# Patient Record
Sex: MALE | Race: WHITE | NOT HISPANIC OR LATINO
[De-identification: names, ages, dates, MRNs, and addresses within clinical notes are randomized per-mention and may not be internally consistent; named-entity substitution may affect disease eponyms.]

---

## 2019-08-02 ENCOUNTER — NON-APPOINTMENT (OUTPATIENT)
Age: 64
End: 2019-08-02

## 2019-08-02 ENCOUNTER — APPOINTMENT (OUTPATIENT)
Dept: CARDIOLOGY | Facility: CLINIC | Age: 64
End: 2019-08-02
Payer: MEDICARE

## 2019-08-02 VITALS
DIASTOLIC BLOOD PRESSURE: 80 MMHG | TEMPERATURE: 97.9 F | HEART RATE: 69 BPM | SYSTOLIC BLOOD PRESSURE: 117 MMHG | WEIGHT: 206 LBS | OXYGEN SATURATION: 98 % | BODY MASS INDEX: 30.51 KG/M2 | HEIGHT: 69 IN

## 2019-08-02 DIAGNOSIS — F41.9 ANXIETY DISORDER, UNSPECIFIED: ICD-10-CM

## 2019-08-02 DIAGNOSIS — R42 DIZZINESS AND GIDDINESS: ICD-10-CM

## 2019-08-02 PROCEDURE — 99205 OFFICE O/P NEW HI 60 MIN: CPT

## 2019-08-02 PROCEDURE — 93000 ELECTROCARDIOGRAM COMPLETE: CPT

## 2019-08-02 PROCEDURE — 93306 TTE W/DOPPLER COMPLETE: CPT

## 2019-08-02 RX ORDER — CYCLOBENZAPRINE HYDROCHLORIDE 10 MG/1
10 TABLET, FILM COATED ORAL
Qty: 180 | Refills: 3 | Status: ACTIVE | COMMUNITY
Start: 2019-08-02 | End: 1900-01-01

## 2019-08-02 NOTE — PHYSICAL EXAM
[General Appearance - Well Developed] : well developed [Normal Appearance] : normal appearance [Well Groomed] : well groomed [General Appearance - Well Nourished] : well nourished [No Deformities] : no deformities [General Appearance - In No Acute Distress] : no acute distress [Normal Conjunctiva] : the conjunctiva exhibited no abnormalities [Eyelids - No Xanthelasma] : the eyelids demonstrated no xanthelasmas [No Oral Pallor] : no oral pallor [Normal Oral Mucosa] : normal oral mucosa [No Oral Cyanosis] : no oral cyanosis [Normal Jugular Venous A Waves Present] : normal jugular venous A waves present [Normal Jugular Venous V Waves Present] : normal jugular venous V waves present [No Jugular Venous Bowie A Waves] : no jugular venous bowie A waves [Heart Rate And Rhythm] : heart rate and rhythm were normal [Heart Sounds] : normal S1 and S2 [Murmurs] : no murmurs present [Respiration, Rhythm And Depth] : normal respiratory rhythm and effort [Exaggerated Use Of Accessory Muscles For Inspiration] : no accessory muscle use [Auscultation Breath Sounds / Voice Sounds] : lungs were clear to auscultation bilaterally [Abdomen Tenderness] : non-tender [Abdomen Soft] : soft [Abdomen Mass (___ Cm)] : no abdominal mass palpated [Abnormal Walk] : normal gait [Gait - Sufficient For Exercise Testing] : the gait was sufficient for exercise testing [Nail Clubbing] : no clubbing of the fingernails [Cyanosis, Localized] : no localized cyanosis [Petechial Hemorrhages (___cm)] : no petechial hemorrhages [Skin Color & Pigmentation] : normal skin color and pigmentation [] : no rash [No Venous Stasis] : no venous stasis [Skin Lesions] : no skin lesions [No Skin Ulcers] : no skin ulcer [No Xanthoma] : no  xanthoma was observed [Oriented To Time, Place, And Person] : oriented to person, place, and time [Affect] : the affect was normal [Mood] : the mood was normal [No Anxiety] : not feeling anxious

## 2019-08-06 NOTE — REASON FOR VISIT
[FreeTextEntry1] : Dear Dr. Mireles:\par \par I had the pleasure of seeing your patient, Mr. KALI SAMPSON in the office today for evaluation of chest pain.  I last saw him in January 2014, and since then, he and his wife have moved to [facility] where he spends most of the year.  He has remained mostly asymptomatic from the cardiac perspective over the past several years.  He is asking for refills to medications.\par \par As you know, he is a 62 yo man with a history of coronary artery disease status post previous PCIs performed by Dr. Pavel Murphy, PAD, DM2, HTN, and hyperlipidemia.  He still smokes.  He denies having exertional chest discomfort.  We reviewed his medications and he is still on DAPT with ASA and clopidogrel (on the advice of Dr. Murphy, who recommended that he stay on DAPT for the "rest of his life" because of his severity of CAD).  \par \par 12-lead ECG demonstrates SR.  Physical examination was unremarkable.\par \par Refills for his medications were provided.  Will arrange for an echocardiogram.\par \par Dr. Mireles, thank you again for entrusting his care with me.\par \par Best regards,\par Fabian Sommers

## 2019-09-17 ENCOUNTER — INPATIENT (INPATIENT)
Facility: HOSPITAL | Age: 64
LOS: 6 days | Discharge: HOME HEALTH | End: 2019-09-24
Attending: HOSPITALIST | Admitting: HOSPITALIST
Payer: MEDICARE

## 2019-09-17 VITALS
HEART RATE: 80 BPM | RESPIRATION RATE: 17 BRPM | DIASTOLIC BLOOD PRESSURE: 85 MMHG | SYSTOLIC BLOOD PRESSURE: 134 MMHG | OXYGEN SATURATION: 100 % | TEMPERATURE: 98 F

## 2019-09-17 DIAGNOSIS — E11.40 TYPE 2 DIABETES MELLITUS WITH DIABETIC NEUROPATHY, UNSPECIFIED: ICD-10-CM

## 2019-09-17 DIAGNOSIS — E87.2 ACIDOSIS: ICD-10-CM

## 2019-09-17 DIAGNOSIS — I10 ESSENTIAL (PRIMARY) HYPERTENSION: ICD-10-CM

## 2019-09-17 DIAGNOSIS — L97.529 NON-PRESSURE CHRONIC ULCER OF OTHER PART OF LEFT FOOT WITH UNSPECIFIED SEVERITY: ICD-10-CM

## 2019-09-17 DIAGNOSIS — Z95.5 PRESENCE OF CORONARY ANGIOPLASTY IMPLANT AND GRAFT: ICD-10-CM

## 2019-09-17 DIAGNOSIS — Z29.9 ENCOUNTER FOR PROPHYLACTIC MEASURES, UNSPECIFIED: ICD-10-CM

## 2019-09-17 LAB
ALBUMIN SERPL ELPH-MCNC: 4.1 G/DL — SIGNIFICANT CHANGE UP (ref 3.3–5)
ALP SERPL-CCNC: 101 U/L — SIGNIFICANT CHANGE UP (ref 40–120)
ALT FLD-CCNC: 10 U/L — SIGNIFICANT CHANGE UP (ref 4–41)
ANION GAP SERPL CALC-SCNC: 11 MMO/L — SIGNIFICANT CHANGE UP (ref 7–14)
APTT BLD: 37.4 SEC — HIGH (ref 27.5–36.3)
AST SERPL-CCNC: 15 U/L — SIGNIFICANT CHANGE UP (ref 4–40)
BASE EXCESS BLDV CALC-SCNC: 2.1 MMOL/L — SIGNIFICANT CHANGE UP
BASOPHILS # BLD AUTO: 0.1 K/UL — SIGNIFICANT CHANGE UP (ref 0–0.2)
BASOPHILS NFR BLD AUTO: 0.7 % — SIGNIFICANT CHANGE UP (ref 0–2)
BILIRUB SERPL-MCNC: 0.3 MG/DL — SIGNIFICANT CHANGE UP (ref 0.2–1.2)
BLOOD GAS VENOUS - CREATININE: 0.75 MG/DL — SIGNIFICANT CHANGE UP (ref 0.5–1.3)
BUN SERPL-MCNC: 18 MG/DL — SIGNIFICANT CHANGE UP (ref 7–23)
CALCIUM SERPL-MCNC: 10 MG/DL — SIGNIFICANT CHANGE UP (ref 8.4–10.5)
CHLORIDE BLDV-SCNC: 108 MMOL/L — SIGNIFICANT CHANGE UP (ref 96–108)
CHLORIDE SERPL-SCNC: 100 MMOL/L — SIGNIFICANT CHANGE UP (ref 98–107)
CO2 SERPL-SCNC: 26 MMOL/L — SIGNIFICANT CHANGE UP (ref 22–31)
CREAT SERPL-MCNC: 0.83 MG/DL — SIGNIFICANT CHANGE UP (ref 0.5–1.3)
CRP SERPL-MCNC: 28.6 MG/L — HIGH
EOSINOPHIL # BLD AUTO: 0.49 K/UL — SIGNIFICANT CHANGE UP (ref 0–0.5)
EOSINOPHIL NFR BLD AUTO: 3.2 % — SIGNIFICANT CHANGE UP (ref 0–6)
ERYTHROCYTE [SEDIMENTATION RATE] IN BLOOD: 51 MM/HR — HIGH (ref 1–15)
GAS PNL BLDV: 135 MMOL/L — LOW (ref 136–146)
GLUCOSE BLDC GLUCOMTR-MCNC: 138 MG/DL — HIGH (ref 70–99)
GLUCOSE BLDV-MCNC: 141 MG/DL — HIGH (ref 70–99)
GLUCOSE SERPL-MCNC: 141 MG/DL — HIGH (ref 70–99)
GRAM STN SPEC: SIGNIFICANT CHANGE UP
HCO3 BLDV-SCNC: 23 MMOL/L — SIGNIFICANT CHANGE UP (ref 20–27)
HCT VFR BLD CALC: 52.1 % — HIGH (ref 39–50)
HCT VFR BLDV CALC: 53.4 % — HIGH (ref 39–51)
HGB BLD-MCNC: 17.2 G/DL — HIGH (ref 13–17)
HGB BLDV-MCNC: 17.5 G/DL — HIGH (ref 13–17)
IMM GRANULOCYTES NFR BLD AUTO: 0.6 % — SIGNIFICANT CHANGE UP (ref 0–1.5)
INR BLD: 1.05 — SIGNIFICANT CHANGE UP (ref 0.88–1.17)
LACTATE BLDV-MCNC: 2.4 MMOL/L — HIGH (ref 0.5–2)
LACTATE SERPL-SCNC: 1.5 MMOL/L — SIGNIFICANT CHANGE UP (ref 0.5–2)
LYMPHOCYTES # BLD AUTO: 17.1 % — SIGNIFICANT CHANGE UP (ref 13–44)
LYMPHOCYTES # BLD AUTO: 2.62 K/UL — SIGNIFICANT CHANGE UP (ref 1–3.3)
MCHC RBC-ENTMCNC: 28.6 PG — SIGNIFICANT CHANGE UP (ref 27–34)
MCHC RBC-ENTMCNC: 33 % — SIGNIFICANT CHANGE UP (ref 32–36)
MCV RBC AUTO: 86.7 FL — SIGNIFICANT CHANGE UP (ref 80–100)
MONOCYTES # BLD AUTO: 1.22 K/UL — HIGH (ref 0–0.9)
MONOCYTES NFR BLD AUTO: 8 % — SIGNIFICANT CHANGE UP (ref 2–14)
NEUTROPHILS # BLD AUTO: 10.79 K/UL — HIGH (ref 1.8–7.4)
NEUTROPHILS NFR BLD AUTO: 70.4 % — SIGNIFICANT CHANGE UP (ref 43–77)
NRBC # FLD: 0 K/UL — SIGNIFICANT CHANGE UP (ref 0–0)
PCO2 BLDV: 59 MMHG — HIGH (ref 41–51)
PH BLDV: 7.3 PH — LOW (ref 7.32–7.43)
PLATELET # BLD AUTO: 339 K/UL — SIGNIFICANT CHANGE UP (ref 150–400)
PMV BLD: 10 FL — SIGNIFICANT CHANGE UP (ref 7–13)
PO2 BLDV: 31 MMHG — LOW (ref 35–40)
POTASSIUM BLDV-SCNC: 4.4 MMOL/L — SIGNIFICANT CHANGE UP (ref 3.4–4.5)
POTASSIUM SERPL-MCNC: 4.8 MMOL/L — SIGNIFICANT CHANGE UP (ref 3.5–5.3)
POTASSIUM SERPL-SCNC: 4.8 MMOL/L — SIGNIFICANT CHANGE UP (ref 3.5–5.3)
PROT SERPL-MCNC: 7.9 G/DL — SIGNIFICANT CHANGE UP (ref 6–8.3)
PROTHROM AB SERPL-ACNC: 11.7 SEC — SIGNIFICANT CHANGE UP (ref 9.8–13.1)
RBC # BLD: 6.01 M/UL — HIGH (ref 4.2–5.8)
RBC # FLD: 13.2 % — SIGNIFICANT CHANGE UP (ref 10.3–14.5)
SAO2 % BLDV: 57 % — LOW (ref 60–85)
SODIUM SERPL-SCNC: 137 MMOL/L — SIGNIFICANT CHANGE UP (ref 135–145)
SPECIMEN SOURCE: SIGNIFICANT CHANGE UP
WBC # BLD: 15.31 K/UL — HIGH (ref 3.8–10.5)
WBC # FLD AUTO: 15.31 K/UL — HIGH (ref 3.8–10.5)

## 2019-09-17 PROCEDURE — 73630 X-RAY EXAM OF FOOT: CPT | Mod: 26,LT

## 2019-09-17 PROCEDURE — 73610 X-RAY EXAM OF ANKLE: CPT | Mod: 26,LT

## 2019-09-17 PROCEDURE — 93923 UPR/LXTR ART STDY 3+ LVLS: CPT | Mod: 26

## 2019-09-17 PROCEDURE — 99223 1ST HOSP IP/OBS HIGH 75: CPT | Mod: AI,GC

## 2019-09-17 RX ORDER — DEXTROSE 50 % IN WATER 50 %
15 SYRINGE (ML) INTRAVENOUS ONCE
Refills: 0 | Status: DISCONTINUED | OUTPATIENT
Start: 2019-09-17 | End: 2019-09-24

## 2019-09-17 RX ORDER — SIMVASTATIN 20 MG/1
10 TABLET, FILM COATED ORAL AT BEDTIME
Refills: 0 | Status: DISCONTINUED | OUTPATIENT
Start: 2019-09-17 | End: 2019-09-24

## 2019-09-17 RX ORDER — ASPIRIN/CALCIUM CARB/MAGNESIUM 324 MG
325 TABLET ORAL DAILY
Refills: 0 | Status: DISCONTINUED | OUTPATIENT
Start: 2019-09-17 | End: 2019-09-19

## 2019-09-17 RX ORDER — INSULIN LISPRO 100/ML
VIAL (ML) SUBCUTANEOUS
Refills: 0 | Status: DISCONTINUED | OUTPATIENT
Start: 2019-09-17 | End: 2019-09-24

## 2019-09-17 RX ORDER — INFLUENZA VIRUS VACCINE 15; 15; 15; 15 UG/.5ML; UG/.5ML; UG/.5ML; UG/.5ML
0.5 SUSPENSION INTRAMUSCULAR ONCE
Refills: 0 | Status: DISCONTINUED | OUTPATIENT
Start: 2019-09-17 | End: 2019-09-24

## 2019-09-17 RX ORDER — SODIUM CHLORIDE 9 MG/ML
1000 INJECTION, SOLUTION INTRAVENOUS
Refills: 0 | Status: DISCONTINUED | OUTPATIENT
Start: 2019-09-17 | End: 2019-09-24

## 2019-09-17 RX ORDER — DEXTROSE 50 % IN WATER 50 %
25 SYRINGE (ML) INTRAVENOUS ONCE
Refills: 0 | Status: DISCONTINUED | OUTPATIENT
Start: 2019-09-17 | End: 2019-09-24

## 2019-09-17 RX ORDER — DEXTROSE 50 % IN WATER 50 %
12.5 SYRINGE (ML) INTRAVENOUS ONCE
Refills: 0 | Status: DISCONTINUED | OUTPATIENT
Start: 2019-09-17 | End: 2019-09-24

## 2019-09-17 RX ORDER — LIDOCAINE HCL 20 MG/ML
20 VIAL (ML) INJECTION ONCE
Refills: 0 | Status: DISCONTINUED | OUTPATIENT
Start: 2019-09-17 | End: 2019-09-24

## 2019-09-17 RX ORDER — METOPROLOL TARTRATE 50 MG
50 TABLET ORAL DAILY
Refills: 0 | Status: DISCONTINUED | OUTPATIENT
Start: 2019-09-17 | End: 2019-09-18

## 2019-09-17 RX ORDER — LISINOPRIL 2.5 MG/1
5 TABLET ORAL DAILY
Refills: 0 | Status: DISCONTINUED | OUTPATIENT
Start: 2019-09-17 | End: 2019-09-18

## 2019-09-17 RX ORDER — VANCOMYCIN HCL 1 G
1000 VIAL (EA) INTRAVENOUS EVERY 12 HOURS
Refills: 0 | Status: DISCONTINUED | OUTPATIENT
Start: 2019-09-17 | End: 2019-09-19

## 2019-09-17 RX ORDER — PIPERACILLIN AND TAZOBACTAM 4; .5 G/20ML; G/20ML
3.38 INJECTION, POWDER, LYOPHILIZED, FOR SOLUTION INTRAVENOUS ONCE
Refills: 0 | Status: COMPLETED | OUTPATIENT
Start: 2019-09-17 | End: 2019-09-17

## 2019-09-17 RX ORDER — INSULIN LISPRO 100/ML
VIAL (ML) SUBCUTANEOUS AT BEDTIME
Refills: 0 | Status: DISCONTINUED | OUTPATIENT
Start: 2019-09-17 | End: 2019-09-24

## 2019-09-17 RX ORDER — PIPERACILLIN AND TAZOBACTAM 4; .5 G/20ML; G/20ML
3.38 INJECTION, POWDER, LYOPHILIZED, FOR SOLUTION INTRAVENOUS EVERY 8 HOURS
Refills: 0 | Status: DISCONTINUED | OUTPATIENT
Start: 2019-09-17 | End: 2019-09-23

## 2019-09-17 RX ORDER — SODIUM CHLORIDE 9 MG/ML
1000 INJECTION INTRAMUSCULAR; INTRAVENOUS; SUBCUTANEOUS
Refills: 0 | Status: DISCONTINUED | OUTPATIENT
Start: 2019-09-17 | End: 2019-09-19

## 2019-09-17 RX ORDER — GLUCAGON INJECTION, SOLUTION 0.5 MG/.1ML
1 INJECTION, SOLUTION SUBCUTANEOUS ONCE
Refills: 0 | Status: DISCONTINUED | OUTPATIENT
Start: 2019-09-17 | End: 2019-09-24

## 2019-09-17 RX ORDER — VANCOMYCIN HCL 1 G
1000 VIAL (EA) INTRAVENOUS ONCE
Refills: 0 | Status: COMPLETED | OUTPATIENT
Start: 2019-09-17 | End: 2019-09-17

## 2019-09-17 RX ORDER — ENOXAPARIN SODIUM 100 MG/ML
40 INJECTION SUBCUTANEOUS DAILY
Refills: 0 | Status: DISCONTINUED | OUTPATIENT
Start: 2019-09-17 | End: 2019-09-24

## 2019-09-17 RX ORDER — CLOPIDOGREL BISULFATE 75 MG/1
75 TABLET, FILM COATED ORAL DAILY
Refills: 0 | Status: DISCONTINUED | OUTPATIENT
Start: 2019-09-17 | End: 2019-09-24

## 2019-09-17 RX ORDER — COLLAGENASE CLOSTRIDIUM HIST. 250 UNIT/G
1 OINTMENT (GRAM) TOPICAL DAILY
Refills: 0 | Status: DISCONTINUED | OUTPATIENT
Start: 2019-09-17 | End: 2019-09-24

## 2019-09-17 RX ORDER — ESCITALOPRAM OXALATE 10 MG/1
5 TABLET, FILM COATED ORAL DAILY
Refills: 0 | Status: DISCONTINUED | OUTPATIENT
Start: 2019-09-17 | End: 2019-09-17

## 2019-09-17 RX ADMIN — PIPERACILLIN AND TAZOBACTAM 25 GRAM(S): 4; .5 INJECTION, POWDER, LYOPHILIZED, FOR SOLUTION INTRAVENOUS at 22:45

## 2019-09-17 RX ADMIN — SODIUM CHLORIDE 75 MILLILITER(S): 9 INJECTION INTRAMUSCULAR; INTRAVENOUS; SUBCUTANEOUS at 19:16

## 2019-09-17 RX ADMIN — PIPERACILLIN AND TAZOBACTAM 200 GRAM(S): 4; .5 INJECTION, POWDER, LYOPHILIZED, FOR SOLUTION INTRAVENOUS at 13:11

## 2019-09-17 RX ADMIN — Medication 250 MILLIGRAM(S): at 14:48

## 2019-09-17 RX ADMIN — ENOXAPARIN SODIUM 40 MILLIGRAM(S): 100 INJECTION SUBCUTANEOUS at 22:46

## 2019-09-17 RX ADMIN — SIMVASTATIN 10 MILLIGRAM(S): 20 TABLET, FILM COATED ORAL at 22:46

## 2019-09-17 NOTE — H&P ADULT - NSICDXPASTSURGICALHX_GEN_ALL_CORE_FT
PAST SURGICAL HISTORY:  Ankle Fracture left repaired    H/O: Varicose Veins laser surgery    Knee Arthroplasty

## 2019-09-17 NOTE — H&P ADULT - NSHPPHYSICALEXAM_GEN_ALL_CORE
Vital Signs Last 24 Hrs  T(C): 36.7 (17 Sep 2019 09:42), Max: 36.7 (17 Sep 2019 09:42)  T(F): 98 (17 Sep 2019 09:42), Max: 98 (17 Sep 2019 09:42)  HR: 80 (17 Sep 2019 09:42) (80 - 80)  BP: 134/85 (17 Sep 2019 09:42) (134/85 - 134/85)  BP(mean): --  RR: 17 (17 Sep 2019 09:42) (17 - 17)  SpO2: 100% (17 Sep 2019 09:42) (100% - 100%)    General: Well-nourished, well-developed, NAD  Head: Normocephalic, Atraumatic  Eyes: PERRLA, EOMI, normal sclera  Throat: Moist mucous membranes  Respiratory: CTAB, normal respiratory effort, no wheezes, crackles, rales  CV: RRR, S1/S2, no murmurs, rubs or gallops  Abdominal: Soft, bowel sounds present, NT, ND +BS  Extremities: No edema, 2+ DP pulses  Neurological: A&Ox4, MAEx4, non-focal  Skin: No rashes Vital Signs Last 24 Hrs  T(C): 36.7 (17 Sep 2019 09:42), Max: 36.7 (17 Sep 2019 09:42)  T(F): 98 (17 Sep 2019 09:42), Max: 98 (17 Sep 2019 09:42)  HR: 80 (17 Sep 2019 09:42) (80 - 80)  BP: 134/85 (17 Sep 2019 09:42) (134/85 - 134/85)  BP(mean): --  RR: 17 (17 Sep 2019 09:42) (17 - 17)  SpO2: 100% (17 Sep 2019 09:42) (100% - 100%)    General: Well-nourished, well-developed, NAD, obese  Head: Normocephalic, Atraumatic  Eyes: PERRLA, EOMI, normal sclera  Throat: Moist mucous membranes  Respiratory: CTAB, normal respiratory effort, no wheezes, crackles, rales  CV: RRR, S1/S2, no murmurs, rubs or gallops  Abdominal: Soft, bowel sounds present, NT, ND +BS  Extremities: No edema, foot pulses weak, left foot with brown open crusted ulcer on fifth metatarsal, no drainage or odor, no purulence  Neurological: A&Ox4, MAEx4, non-focal  Skin: No rashes

## 2019-09-17 NOTE — ED PROVIDER NOTE - PROGRESS NOTE DETAILS
[facility]: Pt seen by podiatry who recommend admission for IV abx. spoke with hospitalist who accepted patient, aware labs pending. text paged MAR. pt stable.

## 2019-09-17 NOTE — ED PROVIDER NOTE - PHYSICAL EXAMINATION
L foot with lateral plantar ulcer ~ quarter sized with erythema to midfoot and swelling to ankle  sensation intact, moving all digits, pulses intact  FROM of ankle  to TTP.

## 2019-09-17 NOTE — H&P ADULT - HISTORY OF PRESENT ILLNESS
63 y.o male pmhx of HTN, DM, CAD on plavix ASA sent in by Podiatrist Dr. Peacock for L foot ulcer. Pt states 2 weeks ago noticed a blister to his L lateral plantar foot, it popped a few days ago and pt had amoxicillin at home that he started himself. Went to see his podiatrist yesterday who advised to come to the ED. Pt has been ambulatory,using cane for assistance and arrives in surgical shoe. denies any systemic symptoms. denies fevers, chills, chest pain, sob, cough, n/v/d, abdominal pain, numbness, tingling, weakness. 63 year old male with history of HTN, T2DM (on insulin), CAD s/p stents was sent in by podiatrist for a left foot ulcer. The patient states that he developed a blister on his left foot about 2-3 weeks ago. Since he has peripheral neuropathy, he cannot feel any sensation in his feet. So, he could not feel that the blister had developed into an ulcer. It eventually became malodorous and started to bleed. He tried amoxicillin 875 mg BID for four days and saw that there was no improvement. So, he went to see his podiatrist who told him to go to the ED. The patient believes the ulcer was caused by new shoes he bought three weeks ago, as he had no associated trauma. He is supposed to see his podiatrist every three months, but has not seen him in one year. He has been compliant with his insulin regimen, but there was a period when he did not have his medications for two weeks due to insurance issues. His blood sugar elevated to around 400 mg/dL.  He denies fevers, chills, chest pain, shortness of breath, cough, nausea, vomiting, diarrhea, abdominal pain, diarrhea, or urinary symptoms.    In the ED, vital signs were stable.  The patient received 1g vancomycin x1, zosyn x1, lidocaine 1% injection, and collagenase ointment.

## 2019-09-17 NOTE — ED PROVIDER NOTE - OBJECTIVE STATEMENT
63 y.o male pmhx of HTN, DM, CAD on plavix ASA sent in by Podiatrist Dr. Peacock for L foot ulcer. Pt states 2 weeks ago noticed a blister to his L lateral plantar foot, it popped a few days ago and pt had amoxicillin at home that he started himself. Went to see his podiatrist yesterday who advised to come to the ED. Pt has been ambulatory,using cane for assistance and arrives in surgical shoe. denies any systemic symptoms. denies fevers, chills, chest pain, sob, cough, n/v/d, abdominal pain, numbness, tingling, weakness.

## 2019-09-17 NOTE — ED ADULT NURSE NOTE - NSIMPLEMENTINTERV_GEN_ALL_ED
Implemented All Fall Risk Interventions:  [place] to call system. Call bell, personal items and telephone within reach. Instruct patient to call for assistance. Room bathroom lighting operational. Non-slip footwear when patient is off stretcher. Physically safe environment: no spills, clutter or unnecessary equipment. Stretcher in lowest position, wheels locked, appropriate side rails in place. Provide visual cue, wrist band, yellow gown, etc. Monitor gait and stability. Monitor for mental status changes and reorient to person, place, and time. Review medications for side effects contributing to fall risk. Reinforce activity limits and safety measures with patient and family.

## 2019-09-17 NOTE — H&P ADULT - PROBLEM SELECTOR PLAN 6
- DVT: low improve score; SCDs  - Diet: DASH/TLC consistent carbohydrates - DVT: lovenox  - Diet: DASH/TLC consistent carbohydrates

## 2019-09-17 NOTE — H&P ADULT - PROBLEM SELECTOR PLAN 4
- blood pressure stable  - will hold lisinopril and setting of lactic acidemia  - will resume once lactate normalized

## 2019-09-17 NOTE — ED PROVIDER NOTE - ATTENDING CONTRIBUTION TO CARE
Dr. Brown:  I performed a face to face bedside interview with patient regarding history of present illness, review of symptoms and past medical history. I completed an independent physical exam.  I have discussed patient's plan of care with PA.   I agree with note as stated above, having amended the EMR as needed to reflect my findings.   This includes HISTORY OF PRESENT ILLNESS, HIV, PAST MEDICAL/SURGICAL/FAMILY/SOCIAL HISTORY, ALLERGIES AND HOME MEDICATIONS, REVIEW OF SYSTEMS, PHYSICAL EXAM, and any PROGRESS NOTES during the time I functioned as the attending physician for this patient.    63M h/o CAD on Plavix/Asa, DM, sent in by PCP for foot infection.  Pt noticed blister to left foot two weeks, then spontaneously ruptured.  Started on home Augmentin, saw PCP yesterday (Dr. Peacock), who recommended that he come to ED for IV abx.  Denies fevers and other constitutional symptoms.    Exam:  - nad  - rrr  - ctab   -abd soft ntnd  - +left foot swollen up to ankle and lateral erythema and area of necrotic ulcer centrally, neurovascularly intact    A/P  - infected ulcer in diabetic  - basic labs, esr, crp, XR foot  - podiatry consult  - abx

## 2019-09-17 NOTE — H&P ADULT - ATTENDING COMMENTS
Patient seen and examined by myself , case discussed  with resident ,agree with the above finding and plan  pt denies headache, dizziness, SOB, CP, Palpitations , N/V/D, abdominal pain, reports numbness, tingling in B/L LE Podiatry eval appreciated , left foot wound- eschar debrided, will f/u AVELINA/PVR, MRI   will c/w vanco and Zosyn   f/u Blood and wound cx  elevated lactate: will repeat   DM type 2, will check hba1c , FSBS, ISS   DVT PPX

## 2019-09-17 NOTE — H&P ADULT - PROBLEM SELECTOR PLAN 2
Lactate elevated at 2.4. Etiology unclear as no history of liver disease and not septic.  - will treat with IV fluids  - repeat lactate in AM Lactate elevated at 2.4. Etiology unclear as no history of liver disease and not septic.  - will treat with IV fluids  - will repeat lactate

## 2019-09-17 NOTE — H&P ADULT - PROBLEM SELECTOR PLAN 1
Patient presents with three week history of ulcer. ESR 51. CRP 28.6.  - patient seen by podiatry; follow up on recs  - Foot xray without signs of osteomyelitis  - [facility] duplex and foot MRI ordered  - follow up on wound culture  - continue with IV antibiotics Patient presents with three week history of ulcer. ESR 51. CRP 28.6.  - patient seen by podiatry; follow up on recs  - Foot xray without signs of osteomyelitis  - [facility] duplex and foot MRI ordered  - follow up on wound culture  - continue with vancomycin and zosyn

## 2019-09-17 NOTE — H&P ADULT - ASSESSMENT
63 year old male with history of HTN, T2DM (on insulin), CAD s/p stents was sent in by podiatrist for a left foot ulcer, admitted for further management.

## 2019-09-17 NOTE — H&P ADULT - NSICDXPASTMEDICALHX_GEN_ALL_CORE_FT
PAST MEDICAL HISTORY:  COPD (Chronic Obstructive Pulmonary Disease)     Coronary Artery Disease     Diabetes Mellitus Type II     Hyperlipemia     Hypertension     Old myocardial infarction     Stented Coronary Artery PAST MEDICAL HISTORY:  COPD (Chronic Obstructive Pulmonary Disease)     Diabetes Mellitus Type II     Hypertension     Old myocardial infarction     Stented Coronary Artery

## 2019-09-17 NOTE — H&P ADULT - NSHPREVIEWOFSYSTEMS_GEN_ALL_CORE
REVIEW OF SYSTEMS:  CONSTITUTIONAL: No fever, chills, night sweats, or fatigue  EYES: No eye pain, visual disturbances, or discharge  ENT:  No difficulty hearing, tinnitus, vertigo; No sinus or throat pain  NECK: No pain or stiffness  RESPIRATORY: No cough, wheezing, or hemoptysis; No shortness of breath  CARDIOVASCULAR: No chest pain, palpitations, dizziness, or leg swelling  GASTROINTESTINAL: No abdominal or epigastric pain. No nausea, vomiting, or hematemesis; No diarrhea or constipation. No melena or hematochezia.  GENITOURINARY: No dysuria, frequency, hematuria, or incontinence  NEUROLOGICAL: No headaches, memory loss, loss of strength, numbness, or tremors  SKIN: No itching, burning, rashes, or lesions   LYMPH NODES: No enlarged glands  ENDOCRINE: No hot or cold intolerance; No hair loss  MUSCULOSKELETAL: No joint pain or swelling; No muscle, back, or extremity pain  PSYCHIATRIC: No depression, anxiety, mood swings, or difficulty sleeping  HEME/LYMPH: No easy bruising, or bleeding gums  ALLERGY AND IMMUNOLOGIC: No hives or eczema REVIEW OF SYSTEMS:  CONSTITUTIONAL: No fever, chills, night sweats, or fatigue  EYES: No eye pain, visual disturbances, or discharge  ENT:  No difficulty hearing, tinnitus, vertigo  NECK: No pain or stiffness  RESPIRATORY: No cough, wheezing, or hemoptysis  CARDIOVASCULAR: No chest pain, palpitations, dizziness, or leg swelling  GASTROINTESTINAL: No abdominal or epigastric pain.   GENITOURINARY: No dysuria, frequency, hematuria, or incontinence  NEUROLOGICAL: No headaches, memory loss, loss of strength, numbness, or tremors  SKIN: No itching, burning, rashes, or lesions   LYMPH NODES: No enlarged glands  ENDOCRINE: No hot or cold intolerance; No hair loss  MUSCULOSKELETAL: No joint pain or swelling; No muscle, back, or extremity pain  PSYCHIATRIC: No depression, anxiety, mood swings, or difficulty sleeping  HEME/LYMPH: No easy bruising, or bleeding gums  ALLERGY AND IMMUNOLOGIC: No hives or eczema

## 2019-09-17 NOTE — ED PROVIDER NOTE - PMH
COPD (Chronic Obstructive Pulmonary Disease)    Coronary Artery Disease    Diabetes Mellitus Type II    Hyperlipemia    Hypertension    Old myocardial infarction    Stented Coronary Artery

## 2019-09-17 NOTE — ED PROVIDER NOTE - CLINICAL SUMMARY MEDICAL DECISION MAKING FREE TEXT BOX
63 y.o male pmhx of HTN, DM, CAD on plavix ASA sent in by Podiatrist Dr. Peacock for L foot ulcer. Will check labs, ESR, CRP, xray foot/ankle, podiatry consult, admit.

## 2019-09-17 NOTE — H&P ADULT - NSHPLABSRESULTS_GEN_ALL_CORE
LABS:                          17.2   15.31 )-----------( 339      ( 17 Sep 2019 12:10 )             52.1     Hb Trend: 17.2<--  WBC Trend: 15.31<--  Plt Trend: 339<--          09-17    137  |  100  |  18  ----------------------------<  141<H>  4.8   |  26  |  0.83    Ca    10.0      17 Sep 2019 12:10    TPro  7.9  /  Alb  4.1  /  TBili  0.3  /  DBili  x   /  AST  15  /  ALT  10  /  AlkPhos  101  09-17      PT/INR - ( 17 Sep 2019 12:10 )   PT: 11.7 SEC;   INR: 1.05          PTT - ( 17 Sep 2019 12:10 )  PTT:37.4 SEC    CAPILLARY BLOOD GLUCOSE      POCT Blood Glucose.: 171 mg/dL (17 Sep 2019 09:45)          IMAGING:    < from: Xray Foot AP + Lateral + Oblique, Left (09.17.19 @ 12:54) >    EXAM:  RAD FOOT MIN 3 VIEWS LT      EXAM:  RAD ANKLE MIN 3 VIEWS LEFT        PROCEDURE DATE:  Sep 17 2019         INTERPRETATION:  CLINICAL INDICATION: left ankle/foot ulceration;   evaluate for deep infection    EXAM:  Frontal, oblique, and lateral left ankle and foot from 9/17/2019 at 1254.   IMPRESSION no similar    IMPRESSION:  Suggestion of a shallow posterior heel ulceration. No tracking gas   collections beyond this region and no gross radiographic evidence for   underlying osteomyelitis.    Chronic corticated ossification adjacent to medial malleolar tip, chronic   mild bony productive changes along talofibular margins, and thick   bridging ossification across distal tibiofibular syndesmotic space   consistent with old posttraumaticchange.    No dislocations or acute appearing fractures.    Tarsometatarsal alignment maintained without evidence for a Lisfranc   injury.    Mild tibiotalar arthrosis with subarticular cystic change along the   tibial plafond also apparent. Congenitally fused 5th DIP joint Preserved   remaining joint spaces and no joint margin erosions.     Thick plantar calcaneal enthesophyte with adjacent separate discontinuous   chronic ossifications.    No discrete lytic or blastic lesions.    < end of copied text >

## 2019-09-18 ENCOUNTER — TRANSCRIPTION ENCOUNTER (OUTPATIENT)
Age: 64
End: 2019-09-18

## 2019-09-18 LAB
ANION GAP SERPL CALC-SCNC: 10 MMO/L — SIGNIFICANT CHANGE UP (ref 7–14)
BUN SERPL-MCNC: 13 MG/DL — SIGNIFICANT CHANGE UP (ref 7–23)
CALCIUM SERPL-MCNC: 9.2 MG/DL — SIGNIFICANT CHANGE UP (ref 8.4–10.5)
CHLORIDE SERPL-SCNC: 101 MMOL/L — SIGNIFICANT CHANGE UP (ref 98–107)
CO2 SERPL-SCNC: 24 MMOL/L — SIGNIFICANT CHANGE UP (ref 22–31)
CREAT SERPL-MCNC: 0.85 MG/DL — SIGNIFICANT CHANGE UP (ref 0.5–1.3)
GLUCOSE BLDC GLUCOMTR-MCNC: 120 MG/DL — HIGH (ref 70–99)
GLUCOSE BLDC GLUCOMTR-MCNC: 140 MG/DL — HIGH (ref 70–99)
GLUCOSE BLDC GLUCOMTR-MCNC: 140 MG/DL — HIGH (ref 70–99)
GLUCOSE BLDC GLUCOMTR-MCNC: 144 MG/DL — HIGH (ref 70–99)
GLUCOSE SERPL-MCNC: 133 MG/DL — HIGH (ref 70–99)
HBA1C BLD-MCNC: 9.3 % — HIGH (ref 4–5.6)
HCT VFR BLD CALC: 47.5 % — SIGNIFICANT CHANGE UP (ref 39–50)
HCV AB S/CO SERPL IA: 0.2 S/CO — SIGNIFICANT CHANGE UP (ref 0–0.99)
HCV AB SERPL-IMP: SIGNIFICANT CHANGE UP
HGB BLD-MCNC: 15.9 G/DL — SIGNIFICANT CHANGE UP (ref 13–17)
MAGNESIUM SERPL-MCNC: 1.9 MG/DL — SIGNIFICANT CHANGE UP (ref 1.6–2.6)
MCHC RBC-ENTMCNC: 28.6 PG — SIGNIFICANT CHANGE UP (ref 27–34)
MCHC RBC-ENTMCNC: 33.5 % — SIGNIFICANT CHANGE UP (ref 32–36)
MCV RBC AUTO: 85.6 FL — SIGNIFICANT CHANGE UP (ref 80–100)
NRBC # FLD: 0 K/UL — SIGNIFICANT CHANGE UP (ref 0–0)
PHOSPHATE SERPL-MCNC: 2.4 MG/DL — LOW (ref 2.5–4.5)
PLATELET # BLD AUTO: 309 K/UL — SIGNIFICANT CHANGE UP (ref 150–400)
PMV BLD: 9.8 FL — SIGNIFICANT CHANGE UP (ref 7–13)
POTASSIUM SERPL-MCNC: 4.1 MMOL/L — SIGNIFICANT CHANGE UP (ref 3.5–5.3)
POTASSIUM SERPL-SCNC: 4.1 MMOL/L — SIGNIFICANT CHANGE UP (ref 3.5–5.3)
RBC # BLD: 5.55 M/UL — SIGNIFICANT CHANGE UP (ref 4.2–5.8)
RBC # FLD: 13.2 % — SIGNIFICANT CHANGE UP (ref 10.3–14.5)
SODIUM SERPL-SCNC: 135 MMOL/L — SIGNIFICANT CHANGE UP (ref 135–145)
WBC # BLD: 15.22 K/UL — HIGH (ref 3.8–10.5)
WBC # FLD AUTO: 15.22 K/UL — HIGH (ref 3.8–10.5)

## 2019-09-18 PROCEDURE — 99223 1ST HOSP IP/OBS HIGH 75: CPT

## 2019-09-18 PROCEDURE — 73720 MRI LWR EXTREMITY W/O&W/DYE: CPT | Mod: 26,LT

## 2019-09-18 PROCEDURE — 99233 SBSQ HOSP IP/OBS HIGH 50: CPT | Mod: GC

## 2019-09-18 RX ORDER — ESCITALOPRAM OXALATE 10 MG/1
20 TABLET, FILM COATED ORAL DAILY
Refills: 0 | Status: DISCONTINUED | OUTPATIENT
Start: 2019-09-18 | End: 2019-09-24

## 2019-09-18 RX ORDER — POTASSIUM PHOSPHATE, MONOBASIC POTASSIUM PHOSPHATE, DIBASIC 236; 224 MG/ML; MG/ML
15 INJECTION, SOLUTION INTRAVENOUS ONCE
Refills: 0 | Status: COMPLETED | OUTPATIENT
Start: 2019-09-18 | End: 2019-09-18

## 2019-09-18 RX ORDER — LISINOPRIL 2.5 MG/1
5 TABLET ORAL DAILY
Refills: 0 | Status: DISCONTINUED | OUTPATIENT
Start: 2019-09-18 | End: 2019-09-24

## 2019-09-18 RX ORDER — METOPROLOL TARTRATE 50 MG
50 TABLET ORAL DAILY
Refills: 0 | Status: DISCONTINUED | OUTPATIENT
Start: 2019-09-18 | End: 2019-09-24

## 2019-09-18 RX ADMIN — PIPERACILLIN AND TAZOBACTAM 25 GRAM(S): 4; .5 INJECTION, POWDER, LYOPHILIZED, FOR SOLUTION INTRAVENOUS at 17:28

## 2019-09-18 RX ADMIN — Medication 250 MILLIGRAM(S): at 17:32

## 2019-09-18 RX ADMIN — Medication 250 MILLIGRAM(S): at 03:31

## 2019-09-18 RX ADMIN — SIMVASTATIN 10 MILLIGRAM(S): 20 TABLET, FILM COATED ORAL at 21:31

## 2019-09-18 RX ADMIN — PIPERACILLIN AND TAZOBACTAM 25 GRAM(S): 4; .5 INJECTION, POWDER, LYOPHILIZED, FOR SOLUTION INTRAVENOUS at 05:52

## 2019-09-18 RX ADMIN — Medication 1 APPLICATION(S): at 13:39

## 2019-09-18 RX ADMIN — LISINOPRIL 5 MILLIGRAM(S): 2.5 TABLET ORAL at 05:55

## 2019-09-18 RX ADMIN — ENOXAPARIN SODIUM 40 MILLIGRAM(S): 100 INJECTION SUBCUTANEOUS at 12:52

## 2019-09-18 RX ADMIN — CLOPIDOGREL BISULFATE 75 MILLIGRAM(S): 75 TABLET, FILM COATED ORAL at 12:51

## 2019-09-18 RX ADMIN — ESCITALOPRAM OXALATE 20 MILLIGRAM(S): 10 TABLET, FILM COATED ORAL at 17:38

## 2019-09-18 RX ADMIN — Medication 50 MILLIGRAM(S): at 05:55

## 2019-09-18 RX ADMIN — POTASSIUM PHOSPHATE, MONOBASIC POTASSIUM PHOSPHATE, DIBASIC 62.5 MILLIMOLE(S): 236; 224 INJECTION, SOLUTION INTRAVENOUS at 12:50

## 2019-09-18 RX ADMIN — Medication 325 MILLIGRAM(S): at 19:23

## 2019-09-18 NOTE — DISCHARGE NOTE PROVIDER - NSDCFUADDINST_GEN_ALL_CORE_FT
Podiatry Discharge Instructions:  Follow up: Please follow up with Dr. Carline Peacock within 1 week of discharge from the hospital, please call [phone] for appointment and discuss that you recently were seen in the hospital.  Wound Care: Please apply Santyl Collagenase ointment daily to Left foot wound with daily dressing with 4x4 gauze and Lauren.  Weight bearing: Please weight bear as tolerated in a surgical shoe.  Antibiotics: Please continue as instructed. You were diagnosed with left foot bone infection. You are being discharged with a PICC line through which you can receive antibiotics until your course is completed.     During your hospitalization, your blood sugars were noted to be increased (about 200). You are advised to continue with your insulin. You are advised to modify your diet and closely monitor your blood sugar levels. You should also follow-up with an endocrinologist to ensure that your blood sugar levels are being adequately controlled.     Podiatry Discharge Instructions:  Follow up: Please follow up with Dr. Carline Peacock within 1 week of discharge from the hospital, please call [phone] for appointment and discuss that you recently were seen in the hospital.  Wound Care: Please apply Santyl Collagenase ointment daily to Left foot wound with daily dressing with 4x4 gauze and Lauren.  Weight bearing: Please weight bear as tolerated in a surgical shoe.  Antibiotics: Please continue as instructed.

## 2019-09-18 NOTE — PROGRESS NOTE ADULT - SUBJECTIVE AND OBJECTIVE BOX
Patient is a 63y old  Male who presents with a chief complaint of Left foot ulcer (17 Sep 2019 16:08)      SUBJECTIVE / OVERNIGHT EVENTS:    MEDICATIONS  (STANDING):  aspirin 325 milliGRAM(s) Oral daily  clopidogrel Tablet 75 milliGRAM(s) Oral daily  collagenase Ointment 1 Application(s) Topical daily  dextrose 5%. 1000 milliLiter(s) (50 mL/Hr) IV Continuous <Continuous>  dextrose 50% Injectable 12.5 Gram(s) IV Push once  dextrose 50% Injectable 25 Gram(s) IV Push once  dextrose 50% Injectable 25 Gram(s) IV Push once  enoxaparin Injectable 40 milliGRAM(s) SubCutaneous daily  influenza   Vaccine 0.5 milliLiter(s) IntraMuscular once  insulin lispro (HumaLOG) corrective regimen sliding scale   SubCutaneous three times a day before meals  insulin lispro (HumaLOG) corrective regimen sliding scale   SubCutaneous at bedtime  lidocaine 1% Injectable 20 milliLiter(s) Local Injection Once  lisinopril 5 milliGRAM(s) Oral daily  metoprolol succinate ER 50 milliGRAM(s) Oral daily  piperacillin/tazobactam IVPB.. 3.375 Gram(s) IV Intermittent every 8 hours  simvastatin 10 milliGRAM(s) Oral at bedtime  sodium chloride 0.9%. 1000 milliLiter(s) (75 mL/Hr) IV Continuous <Continuous>  vancomycin  IVPB 1000 milliGRAM(s) IV Intermittent every 12 hours    MEDICATIONS  (PRN):  dextrose 40% Gel 15 Gram(s) Oral once PRN Blood Glucose LESS THAN 70 milliGRAM(s)/deciliter  glucagon  Injectable 1 milliGRAM(s) IntraMuscular once PRN Glucose LESS THAN 70 milligrams/deciliter      Vital Signs Last 24 Hrs  T(C): 36.7 (18 Sep 2019 05:50), Max: 36.9 (17 Sep 2019 22:30)  T(F): 98.1 (18 Sep 2019 05:50), Max: 98.4 (17 Sep 2019 22:30)  HR: 78 (18 Sep 2019 05:50) (70 - 88)  BP: 25/67 (18 Sep 2019 05:50) (25/67 - 135/73)  BP(mean): --  RR: 17 (18 Sep 2019 05:50) (13 - 18)  SpO2: 100% (18 Sep 2019 05:50) (96% - 100%)  CAPILLARY BLOOD GLUCOSE      POCT Blood Glucose.: 138 mg/dL (17 Sep 2019 22:49)  POCT Blood Glucose.: 171 mg/dL (17 Sep 2019 09:45)    I&O's Summary    17 Sep 2019 07:01  -  18 Sep 2019 07:00  --------------------------------------------------------  IN: 0 mL / OUT: 50 mL / NET: -50 mL        PHYSICAL EXAM:  Vital Signs Last 24 Hrs  T(C): 36.7 (09-18-19 @ 05:50)  T(F): 98.1 (09-18-19 @ 05:50), Max: 98.4 (09-17-19 @ 22:30)  HR: 78 (09-18-19 @ 05:50) (70 - 88)  BP: 25/67 (09-18-19 @ 05:50)  BP(mean): --  RR: 17 (09-18-19 @ 05:50) (13 - 18)  SpO2: 100% (09-18-19 @ 05:50) (96% - 100%)  Wt(kg): --    09-17 @ 07:01  -  09-18 @ 07:00  --------------------------------------------------------  IN: 0 mL / OUT: 50 mL / NET: -50 mL      Constitutional: NAD, awake and alert  EYES: EOMI  ENT:  Normal Hearing, no tonsillar exudates   Neck: Soft and supple , no thyromegaly   Respiratory: Breath sounds are clear bilaterally, No wheezing, rales or rhonchi  Cardiovascular: S1 and S2, regular rate and rhythm, no Murmurs, gallops or rubs, no JVD,    Gastrointestinal: Bowel Sounds present, soft, nontender, nondistended, no guarding, no rebound  Extremities: No cyanosis or clubbing; warm to touch  Vascular: 2+ peripheral pulses lower ex  Neurological: No focal deficits, CN II-XII intact bilaterally, sensation to light touch intact in all extremities, gait intact. Pupils are equally reactive to light and symmetrical in size.   Musculoskeletal: 5/5 strength b/l upper and lower extremities; no joint swelling.  Skin: No rashes  Psych: no depression or anhedonia, AAOx3  HEME: no bruises, no nose bleeds      LABS:                        15.9   15.22 )-----------( 309      ( 18 Sep 2019 05:39 )             47.5     09-17    137  |  100  |  18  ----------------------------<  141<H>  4.8   |  26  |  0.83    Ca    10.0      17 Sep 2019 12:10    TPro  7.9  /  Alb  4.1  /  TBili  0.3  /  DBili  x   /  AST  15  /  ALT  10  /  AlkPhos  101  09-17    PT/INR - ( 17 Sep 2019 12:10 )   PT: 11.7 SEC;   INR: 1.05          PTT - ( 17 Sep 2019 12:10 )  PTT:37.4 SEC          RADIOLOGY & ADDITIONAL TESTS:    Imaging Personally Reviewed:    Consultant(s) Notes Reviewed:      Care Discussed with Consultants/Other Providers: Patient is a 63y old  Male who presents with a chief complaint of Left foot ulcer (17 Sep 2019 16:08)      SUBJECTIVE / OVERNIGHT EVENTS: Patient admitted for L foot ulcer. Patient evaluated at bedside. He is complaining of 7-9/10, electric shock like pain in his L foot. He denies fever, N/V, diarrhea, constipation, SOB, lightheadedness, dizziness. No other complaints or concerns expressed at time of interview.      CONSTITUTIONAL: No weakness, fevers or chills  EYES/ENT: No visual changes;  No vertigo or throat pain   NECK: No pain or stiffness  RESPIRATORY: No cough, wheezing, hemoptysis; No shortness of breath  CARDIOVASCULAR: No chest pain or palpitations  GASTROINTESTINAL: No abdominal or epigastric pain. No nausea, vomiting, or hematemesis; No diarrhea or constipation. No melena or hematochezia.  GENITOURINARY: No dysuria, frequency or hematuria  NEUROLOGICAL: + L foot pain. No numbness or weakness  SKIN: No itching, burning, rashes, or lesions   All other review of systems is negative unless indicated above.    MEDICATIONS  (STANDING):  aspirin 325 milliGRAM(s) Oral daily  clopidogrel Tablet 75 milliGRAM(s) Oral daily  collagenase Ointment 1 Application(s) Topical daily  dextrose 5%. 1000 milliLiter(s) (50 mL/Hr) IV Continuous <Continuous>  dextrose 50% Injectable 12.5 Gram(s) IV Push once  dextrose 50% Injectable 25 Gram(s) IV Push once  dextrose 50% Injectable 25 Gram(s) IV Push once  enoxaparin Injectable 40 milliGRAM(s) SubCutaneous daily  influenza   Vaccine 0.5 milliLiter(s) IntraMuscular once  insulin lispro (HumaLOG) corrective regimen sliding scale   SubCutaneous three times a day before meals  insulin lispro (HumaLOG) corrective regimen sliding scale   SubCutaneous at bedtime  lidocaine 1% Injectable 20 milliLiter(s) Local Injection Once  lisinopril 5 milliGRAM(s) Oral daily  metoprolol succinate ER 50 milliGRAM(s) Oral daily  piperacillin/tazobactam IVPB.. 3.375 Gram(s) IV Intermittent every 8 hours  simvastatin 10 milliGRAM(s) Oral at bedtime  sodium chloride 0.9%. 1000 milliLiter(s) (75 mL/Hr) IV Continuous <Continuous>  vancomycin  IVPB 1000 milliGRAM(s) IV Intermittent every 12 hours    MEDICATIONS  (PRN):  dextrose 40% Gel 15 Gram(s) Oral once PRN Blood Glucose LESS THAN 70 milliGRAM(s)/deciliter  glucagon  Injectable 1 milliGRAM(s) IntraMuscular once PRN Glucose LESS THAN 70 milligrams/deciliter      Vital Signs Last 24 Hrs  T(C): 36.7 (18 Sep 2019 05:50), Max: 36.9 (17 Sep 2019 22:30)  T(F): 98.1 (18 Sep 2019 05:50), Max: 98.4 (17 Sep 2019 22:30)  HR: 78 (18 Sep 2019 05:50) (70 - 88)  BP: 25/67 (18 Sep 2019 05:50) (25/67 - 135/73)  BP(mean): --  RR: 17 (18 Sep 2019 05:50) (13 - 18)  SpO2: 100% (18 Sep 2019 05:50) (96% - 100%)  CAPILLARY BLOOD GLUCOSE      POCT Blood Glucose.: 138 mg/dL (17 Sep 2019 22:49)  POCT Blood Glucose.: 171 mg/dL (17 Sep 2019 09:45)    I&O's Summary    17 Sep 2019 07:01  -  18 Sep 2019 07:00  --------------------------------------------------------  IN: 0 mL / OUT: 50 mL / NET: -50 mL        PHYSICAL EXAM:  Vital Signs Last 24 Hrs  T(C): 36.7 (09-18-19 @ 05:50)  T(F): 98.1 (09-18-19 @ 05:50), Max: 98.4 (09-17-19 @ 22:30)  HR: 78 (09-18-19 @ 05:50) (70 - 88)  BP: 25/67 (09-18-19 @ 05:50)  BP(mean): --  RR: 17 (09-18-19 @ 05:50) (13 - 18)  SpO2: 100% (09-18-19 @ 05:50) (96% - 100%)  Wt(kg): --    09-17 @ 07:01  -  09-18 @ 07:00  --------------------------------------------------------  IN: 0 mL / OUT: 50 mL / NET: -50 mL      Constitutional: NAD, awake and alert  EYES: +PERRL, +EOMI, no scleral icterus  ENT: Moist oral mucosa  Respiratory: CTAB; no wheezing, rales or rhonchi  Cardiovascular: +S1/S2; RRR; no murmurs, gallops or rubs    Gastrointestinal: Soft, nontender, nondistended; +BS  Extremities: No cyanosis or clubbing; warm to touch  Vascular: 2+ peripheral pulses x b/l LE  Neurological: No focal deficits, CN II-XII intact bilaterally. A&Ox3.    Musculoskeletal: 5/5 strength b/l upper and lower extremities; no joint swelling. Tender distal LLE.  Skin: Blackened ulcer on L foot with mild erythema      LABS:                        15.9   15.22 )-----------( 309      ( 18 Sep 2019 05:39 )             47.5     09-17    137  |  100  |  18  ----------------------------<  141<H>  4.8   |  26  |  0.83    Ca    10.0      17 Sep 2019 12:10    TPro  7.9  /  Alb  4.1  /  TBili  0.3  /  DBili  x   /  AST  15  /  ALT  10  /  AlkPhos  101  09-17    PT/INR - ( 17 Sep 2019 12:10 )   PT: 11.7 SEC;   INR: 1.05          PTT - ( 17 Sep 2019 12:10 )  PTT:37.4 SEC          RADIOLOGY & ADDITIONAL TESTS:    Imaging Personally Reviewed:    Consultant(s) Notes Reviewed:      Care Discussed with Consultants/Other Providers:

## 2019-09-18 NOTE — PROGRESS NOTE ADULT - PROBLEM SELECTOR PLAN 5
- s/p three stents  - continue with ASA, plavix, and statin - S/p three stents  - C/w ASA, plavix, and statin

## 2019-09-18 NOTE — PROGRESS NOTE ADULT - PROBLEM SELECTOR PLAN 1
Patient presents with three week history of ulcer. ESR 51. CRP 28.6.  - patient seen by podiatry; follow up on recs  - Foot xray without signs of osteomyelitis  - VA duplex and foot MRI ordered  - follow up on wound culture  - continue with vancomycin and zosyn - Ulcer on LLE x 3 weeks, persistent despite Abx treatment  - Venous duplex - L AVELINA decreased at 0.63, L TBI decreased at 0.33 - indicative of arterial disease  - Seen by podiatry, recommends vascular surgery  - Seen by vascular surgery, to undergo angiography of LLE  - Xray did not demonstrate OM  - F/u foot MRI  - F/u wound cx  - C/w vancomycin and zosyn

## 2019-09-18 NOTE — CHART NOTE - NSCHARTNOTEFT_GEN_A_CORE
63 year old male with history of HTN, T2DM (on insulin), CAD s/p stents was sent in by podiatrist for a left foot ulcer, admitted for further management    Patient's RCRI Class II = 6.0% of 30-day mortality given hx of multiple cardiac stents. Patient without arrhythmia, heart failure, symptoms of unstable angina.     Pending patient's EKG.    To be updated.     Kierra Elias, PGY3. 63 year old male with history of HTN, T2DM (on insulin), CAD s/p stents was sent in by podiatrist for a left foot ulcer, admitted for further management    Pre-op evaluation for LLE angiogram, low risk procedure. Patient's RCRI Class II = 6.0% of 30-day mortality given hx of multiple cardiac stents. Patient without arrhythmia, heart failure, symptoms of unstable angina.     Patient with cardiac clearance from Dr. Sommers. Patient is medically optimized for LLE angiogram.       Kierra Elias, PGY3.

## 2019-09-18 NOTE — DISCHARGE NOTE PROVIDER - HOSPITAL COURSE
History of Present Illness    63 year old male with history of HTN, T2DM (on insulin), CAD s/p stents was sent in by podiatrist for a left foot ulcer. The patient states that he developed a blister on his left foot about 2-3 weeks ago. Since he has peripheral neuropathy, he cannot feel any sensation in his feet. So, he could not feel that the blister had developed into an ulcer. It eventually became malodorous and started to bleed. He tried amoxicillin 875 mg BID for four days and saw that there was no improvement. So, he went to see his podiatrist who told him to go to the ED. The patient believes the ulcer was caused by new shoes he bought three weeks ago, as he had no associated trauma. He is supposed to see his podiatrist every three months, but has not seen him in one year. He has been compliant with his insulin regimen, but there was a period when he did not have his medications for two weeks due to insurance issues. His blood sugar elevated to around 400 mg/dL.  He denies fevers, chills, chest pain, shortness of breath, cough, nausea, vomiting, diarrhea, abdominal pain, diarrhea, or urinary symptoms.        Emergency Department Course    In the ED, vital signs were stable. The patient received 1g vancomycin x1, zosyn x1, lidocaine 1% injection, and collagenase ointment.        Hospital Course    Podiatry was consulted; the patient underwent debridement of the wound going to the subcutaneous tissue. Cultures of the wound were demonstrated to grow Klebsiella and Staph aureus. On assessment, the patient was noted to have poor distal lower extremity pulses, specifically on the left; an LE duplex was significant for significant arterial disease of the left lower extremity. Vascular was consulted for revascularization; the patient underwent angiogram with angioplasty. The left lower extremity was assessed for infection; an MRI was significant for evidence of osteomyelitis. Infectious Disease was consulted; the patient was started on zosyn. The patient was evaluated for and received a PICC line to receive long-term antibiotics treatment. Th patient was discharged on ceftriaxone for a total 6-week course to be finished on 10/28/19.        On the day of discharge, the patient was evaluated to be hemodynamically stable. The patient was assessed to be a suitable candidate for discharge. History of Present Illness    63 year old male with history of HTN, T2DM (on insulin), CAD s/p stents was sent in by podiatrist for a left foot ulcer. The patient states that he developed a blister on his left foot about 2-3 weeks ago. Since he has peripheral neuropathy, he cannot feel any sensation in his feet. So, he could not feel that the blister had developed into an ulcer. It eventually became malodorous and started to bleed. He tried amoxicillin 875 mg BID for four days and saw that there was no improvement. So, he went to see his podiatrist who told him to go to the ED. The patient believes the ulcer was caused by new shoes he bought three weeks ago, as he had no associated trauma. He is supposed to see his podiatrist every three months, but has not seen him in one year. He has been compliant with his insulin regimen, but there was a period when he did not have his medications for two weeks due to insurance issues. His blood sugar elevated to around 400 mg/dL.  He denies fevers, chills, chest pain, shortness of breath, cough, nausea, vomiting, diarrhea, abdominal pain, diarrhea, or urinary symptoms.        Emergency Department Course    In the ED, vital signs were stable. The patient received 1g vancomycin x1, zosyn x1, lidocaine 1% injection, and collagenase ointment.        Hospital Course    Podiatry was consulted; the patient underwent debridement of the wound going to the subcutaneous tissue. Cultures of the wound were demonstrated to grow Klebsiella and Staph aureus. On assessment, the patient was noted to have poor distal lower extremity pulses, specifically on the left; an LE duplex was significant for significant arterial disease of the left lower extremity. Vascular was consulted for revascularization; the patient underwent angiogram with angioplasty. The left lower extremity was assessed for infection; an MRI was significant for evidence of osteomyelitis. Infectious Disease was consulted; the patient was started on zosyn. The patient was evaluated for and received a PICC line to receive long-term antibiotics treatment. Th patient was discharged on ceftriaxone for a total 6-week course to be finished on 10/28/19. During his hospital course, the patient was noted to have an elevated A1c and elevated sugar levels. He was maintained on insulin sliding scale. He is advised to continue on his insulin and follow-up with Endocrinology outpatient to ensure adequate glycemic control.        On the day of discharge, the patient was evaluated to be hemodynamically stable. The patient was assessed to be a suitable candidate for discharge. History of Present Illness    63 year old male with history of HTN, T2DM (on insulin), CAD s/p stents was sent in by podiatrist for a left foot ulcer. The patient states that he developed a blister on his left foot about 2-3 weeks ago. Since he has peripheral neuropathy, he cannot feel any sensation in his feet. So, he could not feel that the blister had developed into an ulcer. It eventually became malodorous and started to bleed. He tried amoxicillin 875 mg BID for four days and saw that there was no improvement. So, he went to see his podiatrist who told him to go to the ED. The patient believes the ulcer was caused by new shoes he bought three weeks ago, as he had no associated trauma. He is supposed to see his podiatrist every three months, but has not seen him in one year. He has been compliant with his insulin regimen, but there was a period when he did not have his medications for two weeks due to insurance issues. His blood sugar elevated to around 400 mg/dL.  He denies fevers, chills, chest pain, shortness of breath, cough, nausea, vomiting, diarrhea, abdominal pain, diarrhea, or urinary symptoms.        Emergency Department Course    In the ED, vital signs were stable. The patient received 1g vancomycin x1, zosyn x1, lidocaine 1% injection, and collagenase ointment.        Hospital Course    Podiatry was consulted; the patient underwent debridement of the wound going to the subcutaneous tissue. Cultures of the wound were demonstrated to grow Klebsiella and Staph aureus. On assessment, the patient was noted to have poor distal lower extremity pulses, specifically on the left; an LE duplex was significant for significant arterial disease of the left lower extremity. Vascular was consulted for revascularization; the patient underwent angiogram with balloon angioplasty, no stents placed. The left lower extremity was assessed for infection; an MRI was significant for evidence of osteomyelitis. Infectious Disease was consulted; the patient was started on zosyn. The patient was evaluated for and received a PICC line to receive long-term antibiotics treatment. The patient was discharged on ceftriaxone for a total 6-week course to be finished on 10/28/19. During his hospital course, the patient was noted to have an elevated A1c and elevated sugar levels. He was maintained on insulin sliding scale. He is advised to continue on his insulin and PO regimen and follow-up with Endocrinology outpatient to ensure adequate glycemic control.        On the day of discharge, the patient was evaluated to be hemodynamically stable. The patient was assessed to be a suitable candidate for discharge.

## 2019-09-18 NOTE — PROGRESS NOTE ADULT - PROBLEM SELECTOR PLAN 2
Lactate elevated at 2.4. Etiology unclear as no history of liver disease and not septic.  - will treat with IV fluids  - will repeat lactate - Elevated at 2.4  - Repeat lactate 1.5; resolved

## 2019-09-18 NOTE — DISCHARGE NOTE PROVIDER - NSFOLLOWUPCLINICS_GEN_ALL_ED_FT
[facility]  Endocrinology  [address] [place] [address]  Phone: [phone]  Fax:   Follow Up Time: 7-10 Days

## 2019-09-18 NOTE — PROGRESS NOTE ADULT - PROBLEM SELECTOR PLAN 3
Patient on oral medication and insulin at home.  - will check HbA1C  - ISS  - monitor FSG - History of diabetes, uses insulin at home  - HgbA1c 9.3  - -170  - C/w ISS  - Monitor glucose levels

## 2019-09-18 NOTE — DISCHARGE NOTE PROVIDER - CARE PROVIDER_API CALL
Svetlana Bar)  Surgery  [address] [place] [address]  Phone: [phone]  Fax: [phone]  Follow Up Time: 2 weeks    Ale Hauser)  Infectious Disease; Internal Medicine  [address] [place] [id number]  Phone: [phone]  Fax: [phone]  Follow Up Time: 2 weeks    Carline Peacock)  Podiatric Medicine and Surgery  [address] [place] [address]  Phone: [phone]  Fax: [phone]  Follow Up Time: 1 week

## 2019-09-18 NOTE — DISCHARGE NOTE PROVIDER - PROVIDER TOKENS
PROVIDER:[TOKEN:[[id number]],FOLLOWUP:[2 weeks]],PROVIDER:[TOKEN:[[id number]],FOLLOWUP:[2 weeks]],PROVIDER:[TOKEN:[[id number]],FOLLOWUP:[1 week]]

## 2019-09-18 NOTE — PROGRESS NOTE ADULT - SUBJECTIVE AND OBJECTIVE BOX
Patient is a 63y old  Male who presents with a chief complaint of Left foot ulcer (18 Sep 2019 07:32)       INTERVAL HPI/OVERNIGHT EVENTS:  Patient seen and evaluated at bedside.  Pt is resting comfortable in NAD. Denies N/V/F/C.      Allergies    No Known Allergies    Intolerances        Vital Signs Last 24 Hrs  T(C): 36.7 (18 Sep 2019 05:50), Max: 36.9 (17 Sep 2019 22:30)  T(F): 98.1 (18 Sep 2019 05:50), Max: 98.4 (17 Sep 2019 22:30)  HR: 78 (18 Sep 2019 05:50) (70 - 88)  BP: 25/67 (18 Sep 2019 05:50) (25/67 - 135/73)  BP(mean): --  RR: 17 (18 Sep 2019 05:50) (13 - 18)  SpO2: 100% (18 Sep 2019 05:50) (96% - 100%)    LABS:                        15.9   15.22 )-----------( 309      ( 18 Sep 2019 05:39 )             47.5     09-18    135  |  101  |  13  ----------------------------<  133<H>  4.1   |  24  |  0.85    Ca    9.2      18 Sep 2019 05:39  Phos  2.4     09-18  Mg     1.9     09-18    TPro  7.9  /  Alb  4.1  /  TBili  0.3  /  DBili  x   /  AST  15  /  ALT  10  /  AlkPhos  101  09-17    PT/INR - ( 17 Sep 2019 12:10 )   PT: 11.7 SEC;   INR: 1.05          PTT - ( 17 Sep 2019 12:10 )  PTT:37.4 SEC    CAPILLARY BLOOD GLUCOSE      POCT Blood Glucose.: 138 mg/dL (17 Sep 2019 22:49)  POCT Blood Glucose.: 171 mg/dL (17 Sep 2019 09:45)      Lower Extremity Physical Exam:  Vascular: DP/PT 1/4 right; non palpable left, CFT <3 seconds B/L, Temperature gradient warm to warm left, warm to cold right  Neuro: Epicritic sensation present to the level of toes B/L  Wound #1:   Location: left foot lateral 5th met head  Size: 3 x 2 cm   Depth: down to subQ  Wound bed: necrotic/ischemic  Drainage: none  Odor: malodorous  Periwound: macerated  Etiology: friction    RADIOLOGY & ADDITIONAL TESTS:

## 2019-09-18 NOTE — DISCHARGE NOTE PROVIDER - NSDCCPTREATMENT_GEN_ALL_CORE_FT
PRINCIPAL PROCEDURE  Procedure: MRI left foot  Findings and Treatment: IMPRESSION:  Lateral forefoot soft tissue ulceration over 5th MTP region with   underlying enhancing marrow signal abnormalities suspicious for   osteomyelitis involving 5th metatarsal head and proximal phalanx. No   discrete drainable abscess collections.  Nonspecific plantar myopathy.  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      SECONDARY PROCEDURE  Procedure: VS vein duplex scan BILAT LE  Findings and Treatment: Summary/Impressions:  1.  Right AVELINA is mildly decreased (0.81).  Right TBI is  normal (0.73), with a right toe pressure of 95 mmHg.  No  significant occlusive arterial disease in the right lower  extremity.  2.  Left AVELINA is moderately decreased (0.63). Left TBI is  severely decreased (0.33), with a toe pressure of 43 mmHg.   Findings suggest the presence of multi-segment arterial  disease, including the femoropopliteal segment in the left  lower extremity.

## 2019-09-18 NOTE — PROGRESS NOTE ADULT - ASSESSMENT
62 yo M pt w/ left foot lateral 5th met head wound  - pt seen and evaluated  - left foot lateral forefoot wound, ischemic changes noted to lateral border uclcer with erythema improvement to periwound, no tracking, no malodor  - Awaiting MRI to r/o deeper abscess and early osteomyelitis  - Awaiting AVELINA/PVR to evaluate vascular status - pulses weak on right and non palpapble on left  - 9/17 wound culture demonstrating no growth to date  - Pod rec vasc consult to evaluate vascular status of LLE  - discussed w/ attending 64 yo M pt w/ left foot lateral 5th met head wound  - pt seen and evaluated  - left foot lateral forefoot wound, ischemic changes noted to lateral border ucler with erythema improvement to periwound, no tracking, no malodor  - Awaiting MRI to r/o deeper abscess and early osteomyelitis  - Awaiting AVELINA/PVR to evaluate vascular status - pulses weak on right and non palpable on left  - 9/17 wound culture demonstrating no growth to date  - Pod rec vasc consult to evaluate vascular status of LLE  - discussed w/ attending 62 yo M pt w/ left foot lateral 5th met head wound  - pt seen and evaluated  - left foot lateral forefoot wound, ischemic changes noted to lateral border ucler with erythema improvement to periwound, no tracking, no malodor  - Awaiting MRI to r/o deeper abscess and early osteomyelitis  - AVELINA/PVR - Left AVELINA is moderately decreased (0.63). Left TBI isseverely decreased (0.33), with a toe pressure of 43 mmHg. Findings suggest the presence of multi-segment arterialdisease, including the femoropopliteal segment in the left lower extremity.  - 9/17 wound culture demonstrating no growth to date  - Pod rec vasc consult to evaluate vascular status of LLE due to diminished AVELINA/PVR  - seen w/ attending

## 2019-09-18 NOTE — CONSULT NOTE ADULT - ASSESSMENT
Vascular Sx team plans to perform left lower extremity angiography tomorrow.    From the cardiac perspective, the patient reports having no exertional limitations.  He denies having chest pain, dyspnea, palpitations, orthopnea/PND, syncope/presyncope.  He appears to be euvolemic.    Echocardiogram performed at [address] office in late August 2019 demonstrated normal biventricular systolic function.    From the cardiac perspective, the patient may proceed with LLE angiogram without the need for additional cardiac testing or risk stratification.  Currently on appropriate medical therapy--on DAPT and statin.  With regards to DAPT, can consider low dose aspirin rather than full-dose ASA which he is on now.  Would also recommend that he take atorvastatin 40 mg instead of 80 mg.

## 2019-09-18 NOTE — PROGRESS NOTE ADULT - PROBLEM SELECTOR PLAN 6
- DVT: lovenox  - Diet: DASH/TLC consistent carbohydrates - DVT: Loxenox  - Diet: DASH/TLC consistent carbohydrates

## 2019-09-18 NOTE — PROGRESS NOTE ADULT - PROBLEM SELECTOR PLAN 4
- blood pressure stable  - will hold lisinopril and setting of lactic acidemia  - will resume once lactate normalized - Blood pressure WNL  - Lactate acidemia resolved, will restart lisinopril - Blood pressure WNL  - Lactate acidemia resolved  - Will hold lisinopril in anticipation of angiography/contrast administration

## 2019-09-18 NOTE — DISCHARGE NOTE PROVIDER - NSDCFUADDAPPT_GEN_ALL_CORE_FT
You are advised to follow-up with the following specialties:    Carline Peacock (ANNELISE)  Podiatric Medicine and Surgery  [address] [facility] [address]  [place] [address]  Phone: [phone]    [facility] Endocrinology  Endocrinology  [address] [place] [address]  Phone: [phone]    Svetlana Bar)  Surgery  [address] [place] [address]  Phone: [phone]  Fax: [phone]  Follow Up Time: 2 weeks    Ale Hauser)  Infectious Disease; Internal Medicine  [address] [place] [address]  Phone: [phone]  Fax: [phone]  Follow Up Time: 2 weeks You are advised to follow-up with the following specialties:    Carline Peacock (ANNELISE)  Podiatric Medicine and Surgery  [address] [facility] [address]  [place] [address]  Phone: [phone]    [facility] Endocrinology  Endocrinology  [address] [place] [address]  Phone: [phone]    Svetlana Bar)  Surgery  [address] [place] [address]  Phone: [phone]  Fax: [phone]  Follow Up Time: 2 weeks    Ale Hauser)  Infectious Disease; Internal Medicine  [address] [place] [address]  Phone: [phone]  Fax: [phone]  Follow Up Time: 2 weeks    Weekly cbc, bun/creatinine only, esr - fax results to [phone]

## 2019-09-18 NOTE — DISCHARGE NOTE PROVIDER - CARE PROVIDERS DIRECT ADDRESSES
,DirectAddress_Unknown,[email or web address],DirectAddress_Unknown

## 2019-09-18 NOTE — DISCHARGE NOTE PROVIDER - NSDCCPCAREPLAN_GEN_ALL_CORE_FT
PRINCIPAL DISCHARGE DIAGNOSIS  Diagnosis: Osteomyelitis  Assessment and Plan of Treatment: You were admitted because of an ulcer in your left foot. Podiatry was consulted and the unhealthy tissue was removed from the foot. There was concern for infection of your foot given your history of diabetes; an MRI of your foot demonstrated that you have an infection of the bone. Infectious Disease was consulted and youwere placed on antibiotics to treat this bone infection. You received a PICC line because you will need intravenous antibiotics beyond your discharge.      SECONDARY DISCHARGE DIAGNOSES  Diagnosis: Peripheral arterial disease  Assessment and Plan of Treatment: You were admitted because of an ulcer in your left foot. Podiatry was consulted and the unhealthy tissue was removed from the foot. There were concerns with blood flow in your foot so you received a scan of your foot with demonstrated decreased blood flow. Vascular surgery was consulted and you underwent a angiogram with angioplasty to increase the blood flow.    Diagnosis: Ulcer of left foot, unspecified ulcer stage  Assessment and Plan of Treatment: You were admitted because of an ulcer in your left foot. Podiatry was consulted and they removed the unhealthy tissue from your foot. Your wound was observed and dressed every day by the inpatient Podiatry team and your medical team. You are advised to follow-up with your outpatient podiatrist for follow up regarding your left lower extremity ulcer.    Diagnosis: Essential hypertension  Assessment and Plan of Treatment: You have a diagnosis of hypertension. Over the course of your hospitalization, your blood pressure was noted to be within normal limits. Your blood pressure was monitored daily and determined to be stable.    Diagnosis: Type 2 diabetes mellitus with diabetic neuropathy, with long-term current use of insulin  Assessment and Plan of Treatment: You have a diagnosis of Type 2 diabetes mellitus with neuropathy. On admission, you were noted to have an A1c of 9.3, which means that you have a very high average blood sugar level. Your blood sugars were monitored. You are are advised to continue with your home insulin and antidiabetic regimen and follow up with an endocrinologist to ensure adequate control of your blood sugar levels. PRINCIPAL DISCHARGE DIAGNOSIS  Diagnosis: Osteomyelitis  Assessment and Plan of Treatment: You were admitted because of an ulcer in your left foot. Podiatry was consulted and the unhealthy tissue was removed from the foot. There was concern for infection of your foot given your history of diabetes; an MRI of your foot demonstrated that you have an infection of the bone. Infectious Disease was consulted and youwere placed on antibiotics to treat this bone infection. You received a PICC line because you will need intravenous antibiotics beyond your discharge.  Weekly cbc, bun/creatinine only, esr - fax results to [phone]      SECONDARY DISCHARGE DIAGNOSES  Diagnosis: Ulcer of left foot, unspecified ulcer stage  Assessment and Plan of Treatment: You were admitted because of an ulcer in your left foot. Podiatry was consulted and they removed the unhealthy tissue from your foot. Your wound was observed and dressed every day by the inpatient Podiatry team and your medical team. You are advised to follow-up with your outpatient podiatrist for follow up regarding your left lower extremity ulcer.    Diagnosis: Type 2 diabetes mellitus with diabetic neuropathy, with long-term current use of insulin  Assessment and Plan of Treatment: You have a diagnosis of Type 2 diabetes mellitus with neuropathy. On admission, you were noted to have an A1c of 9.3, which means that you have a very high average blood sugar level. Your blood sugars were monitored. You are are advised to continue with your home insulin and antidiabetic regimen and follow up with an endocrinologist to ensure adequate control of your blood sugar levels.    Diagnosis: Essential hypertension  Assessment and Plan of Treatment: You have a diagnosis of hypertension. Over the course of your hospitalization, your blood pressure was noted to be within normal limits. Your blood pressure was monitored daily and determined to be stable.    Diagnosis: Peripheral arterial disease  Assessment and Plan of Treatment: You were admitted because of an ulcer in your left foot. Podiatry was consulted and the unhealthy tissue was removed from the foot. There were concerns with blood flow in your foot so you received a scan of your foot with demonstrated decreased blood flow. Vascular surgery was consulted and you underwent a angiogram with angioplasty to increase the blood flow. PRINCIPAL DISCHARGE DIAGNOSIS  Diagnosis: Osteomyelitis  Assessment and Plan of Treatment: You were admitted because of an ulcer in your left foot. Podiatry was consulted and the unhealthy tissue was removed from the foot. There was concern for infection of your foot given your history of diabetes; an MRI of your foot demonstrated that you have an infection of the bone. Infectious Disease was consulted and youwere placed on antibiotics to treat this bone infection. You received a PICC line because you will need intravenous antibiotics beyond your discharge.  Weekly cbc, bun/creatinine only, esr - fax results to [phone]      SECONDARY DISCHARGE DIAGNOSES  Diagnosis: Ulcer of left foot, unspecified ulcer stage  Assessment and Plan of Treatment: You were admitted because of an ulcer in your left foot. Podiatry was consulted and they removed the unhealthy tissue from your foot. Your wound was observed and dressed every day by the inpatient Podiatry team and your medical team. You are advised to follow-up with your outpatient podiatrist for follow up regarding your left lower extremity ulcer.    Diagnosis: Type 2 diabetes mellitus with diabetic neuropathy, with long-term current use of insulin  Assessment and Plan of Treatment: You have a diagnosis of Type 2 diabetes mellitus with neuropathy. On admission, you were noted to have an A1c of 9.3, which means that you have a very high average blood sugar level. Your blood sugars were monitored. You are are advised to continue with your home insulin and antidiabetic regimen and follow up with an endocrinologist to ensure adequate control of your blood sugar levels.    Diagnosis: Essential hypertension  Assessment and Plan of Treatment: You have a diagnosis of hypertension. Over the course of your hospitalization, your blood pressure was noted to be within normal limits. Your blood pressure was monitored daily and determined to be stable.    Diagnosis: Peripheral arterial disease  Assessment and Plan of Treatment: You were admitted because of an ulcer in your left foot. Podiatry was consulted and the unhealthy tissue was removed from the foot. There were concerns with blood flow in your foot so you received a scan of your foot with demonstrated decreased blood flow.  Your ASA was decreased to 81 mg. Please START taking this new dosage. Vascular surgery was consulted and you underwent a angiogram with angioplasty to increase the blood flow.

## 2019-09-19 DIAGNOSIS — I73.9 PERIPHERAL VASCULAR DISEASE, UNSPECIFIED: ICD-10-CM

## 2019-09-19 DIAGNOSIS — M86.9 OSTEOMYELITIS, UNSPECIFIED: ICD-10-CM

## 2019-09-19 LAB
ALBUMIN SERPL ELPH-MCNC: 3.6 G/DL — SIGNIFICANT CHANGE UP (ref 3.3–5)
ALP SERPL-CCNC: 83 U/L — SIGNIFICANT CHANGE UP (ref 40–120)
ALT FLD-CCNC: 11 U/L — SIGNIFICANT CHANGE UP (ref 4–41)
ANION GAP SERPL CALC-SCNC: 15 MMO/L — HIGH (ref 7–14)
APTT BLD: 35.7 SEC — SIGNIFICANT CHANGE UP (ref 27.5–36.3)
AST SERPL-CCNC: 16 U/L — SIGNIFICANT CHANGE UP (ref 4–40)
BILIRUB SERPL-MCNC: 0.5 MG/DL — SIGNIFICANT CHANGE UP (ref 0.2–1.2)
BLD GP AB SCN SERPL QL: NEGATIVE — SIGNIFICANT CHANGE UP
BUN SERPL-MCNC: 14 MG/DL — SIGNIFICANT CHANGE UP (ref 7–23)
CALCIUM SERPL-MCNC: 9.6 MG/DL — SIGNIFICANT CHANGE UP (ref 8.4–10.5)
CHLORIDE SERPL-SCNC: 101 MMOL/L — SIGNIFICANT CHANGE UP (ref 98–107)
CO2 SERPL-SCNC: 22 MMOL/L — SIGNIFICANT CHANGE UP (ref 22–31)
CREAT SERPL-MCNC: 0.79 MG/DL — SIGNIFICANT CHANGE UP (ref 0.5–1.3)
GLUCOSE BLDC GLUCOMTR-MCNC: 104 MG/DL — HIGH (ref 70–99)
GLUCOSE BLDC GLUCOMTR-MCNC: 120 MG/DL — HIGH (ref 70–99)
GLUCOSE BLDC GLUCOMTR-MCNC: 211 MG/DL — HIGH (ref 70–99)
GLUCOSE BLDC GLUCOMTR-MCNC: 251 MG/DL — HIGH (ref 70–99)
GLUCOSE SERPL-MCNC: 123 MG/DL — HIGH (ref 70–99)
HCT VFR BLD CALC: 52.6 % — HIGH (ref 39–50)
HGB BLD-MCNC: 16.8 G/DL — SIGNIFICANT CHANGE UP (ref 13–17)
INR BLD: 1.07 — SIGNIFICANT CHANGE UP (ref 0.88–1.17)
MCHC RBC-ENTMCNC: 27.8 PG — SIGNIFICANT CHANGE UP (ref 27–34)
MCHC RBC-ENTMCNC: 31.9 % — LOW (ref 32–36)
MCV RBC AUTO: 87.1 FL — SIGNIFICANT CHANGE UP (ref 80–100)
NRBC # FLD: 0 K/UL — SIGNIFICANT CHANGE UP (ref 0–0)
PLATELET # BLD AUTO: 311 K/UL — SIGNIFICANT CHANGE UP (ref 150–400)
PMV BLD: 9.5 FL — SIGNIFICANT CHANGE UP (ref 7–13)
POTASSIUM SERPL-MCNC: 4.1 MMOL/L — SIGNIFICANT CHANGE UP (ref 3.5–5.3)
POTASSIUM SERPL-SCNC: 4.1 MMOL/L — SIGNIFICANT CHANGE UP (ref 3.5–5.3)
PROT SERPL-MCNC: 7.6 G/DL — SIGNIFICANT CHANGE UP (ref 6–8.3)
PROTHROM AB SERPL-ACNC: 12.2 SEC — SIGNIFICANT CHANGE UP (ref 9.8–13.1)
RBC # BLD: 6.04 M/UL — HIGH (ref 4.2–5.8)
RBC # FLD: 13.2 % — SIGNIFICANT CHANGE UP (ref 10.3–14.5)
RH IG SCN BLD-IMP: POSITIVE — SIGNIFICANT CHANGE UP
SODIUM SERPL-SCNC: 138 MMOL/L — SIGNIFICANT CHANGE UP (ref 135–145)
VANCOMYCIN TROUGH SERPL-MCNC: 7.8 UG/ML — LOW (ref 10–20)
WBC # BLD: 12.99 K/UL — HIGH (ref 3.8–10.5)
WBC # FLD AUTO: 12.99 K/UL — HIGH (ref 3.8–10.5)

## 2019-09-19 PROCEDURE — 99222 1ST HOSP IP/OBS MODERATE 55: CPT

## 2019-09-19 PROCEDURE — 93010 ELECTROCARDIOGRAM REPORT: CPT

## 2019-09-19 PROCEDURE — 99233 SBSQ HOSP IP/OBS HIGH 50: CPT | Mod: GC

## 2019-09-19 RX ORDER — VANCOMYCIN HCL 1 G
1250 VIAL (EA) INTRAVENOUS EVERY 12 HOURS
Refills: 0 | Status: DISCONTINUED | OUTPATIENT
Start: 2019-09-19 | End: 2019-09-20

## 2019-09-19 RX ORDER — ASPIRIN/CALCIUM CARB/MAGNESIUM 324 MG
81 TABLET ORAL DAILY
Refills: 0 | Status: DISCONTINUED | OUTPATIENT
Start: 2019-09-19 | End: 2019-09-24

## 2019-09-19 RX ORDER — NICOTINE POLACRILEX 2 MG
1 GUM BUCCAL EVERY 6 HOURS
Refills: 0 | Status: DISCONTINUED | OUTPATIENT
Start: 2019-09-19 | End: 2019-09-24

## 2019-09-19 RX ORDER — NICOTINE POLACRILEX 2 MG
4 GUM BUCCAL DAILY
Refills: 0 | Status: DISCONTINUED | OUTPATIENT
Start: 2019-09-19 | End: 2019-09-19

## 2019-09-19 RX ADMIN — Medication 81 MILLIGRAM(S): at 16:18

## 2019-09-19 RX ADMIN — Medication 166.67 MILLIGRAM(S): at 18:39

## 2019-09-19 RX ADMIN — Medication 3: at 18:40

## 2019-09-19 RX ADMIN — SIMVASTATIN 10 MILLIGRAM(S): 20 TABLET, FILM COATED ORAL at 21:53

## 2019-09-19 RX ADMIN — Medication 166.67 MILLIGRAM(S): at 06:43

## 2019-09-19 RX ADMIN — Medication 1 APPLICATION(S): at 13:58

## 2019-09-19 RX ADMIN — PIPERACILLIN AND TAZOBACTAM 25 GRAM(S): 4; .5 INJECTION, POWDER, LYOPHILIZED, FOR SOLUTION INTRAVENOUS at 08:58

## 2019-09-19 RX ADMIN — ESCITALOPRAM OXALATE 20 MILLIGRAM(S): 10 TABLET, FILM COATED ORAL at 16:18

## 2019-09-19 RX ADMIN — PIPERACILLIN AND TAZOBACTAM 25 GRAM(S): 4; .5 INJECTION, POWDER, LYOPHILIZED, FOR SOLUTION INTRAVENOUS at 21:53

## 2019-09-19 RX ADMIN — PIPERACILLIN AND TAZOBACTAM 25 GRAM(S): 4; .5 INJECTION, POWDER, LYOPHILIZED, FOR SOLUTION INTRAVENOUS at 14:43

## 2019-09-19 RX ADMIN — PIPERACILLIN AND TAZOBACTAM 25 GRAM(S): 4; .5 INJECTION, POWDER, LYOPHILIZED, FOR SOLUTION INTRAVENOUS at 01:09

## 2019-09-19 NOTE — PROGRESS NOTE ADULT - ASSESSMENT
63 year old male with history of HTN, T2DM (on insulin), CAD s/p stents was sent in by podiatrist for a left foot ulcer, admitted for further management. 63 year old male with history of HTN, T2DM (on insulin), CAD s/p stents was sent in by podiatrist for a left foot ulcer, admitted for further management. Poorly healing LLE ulcer in the setting of LLE arterial disease, to undergo angiography. MRI suggestive of MRI, ID following, on Abx.

## 2019-09-19 NOTE — PROGRESS NOTE ADULT - PROBLEM SELECTOR PLAN 4
- Blood pressure WNL  - Lactate acidemia resolved  - Will hold lisinopril in anticipation of angiography/contrast administration - S/p three stents  - C/w ASA, plavix, and statin - History of diabetes, uses insulin at home  - HgbA1c 9.3  - -150  - C/w ISS  - Monitor glucose levels

## 2019-09-19 NOTE — PROGRESS NOTE ADULT - PROBLEM SELECTOR PLAN 1
- Ulcer on LLE x 3 weeks, persistent despite Abx treatment  - Venous duplex - L AVELINA decreased at 0.63, L TBI decreased at 0.33 - indicative of arterial disease  - Seen by podiatry, recommends vascular surgery  - Seen by vascular surgery, to undergo angiography of LLE  - Xray did not demonstrate OM  - F/u foot MRI  - F/u wound cx  - C/w vancomycin and zosyn - Ulcer on LLE x 3 weeks, poorly healing ulcer  - Venous duplex - L AVELINA decreased at 0.63, L TBI decreased at 0.33 - indicative of arterial disease  - To undergo angiography of LLE per vascular surgery  - MRI suggestive of OM  - F/u wound cx  - ID consulted, c/w vancomycin and zosyn - MRI 9/18 suggestive of OM  - ID consulted, to continue vancomycin and zosyn  - F/u wound cx

## 2019-09-19 NOTE — PROGRESS NOTE ADULT - SUBJECTIVE AND OBJECTIVE BOX
Patient is a 63y old  Male who presents with a chief complaint of Left foot ulcer (19 Sep 2019 07:21)       INTERVAL HPI/OVERNIGHT EVENTS:  Patient seen and evaluated at bedside.  Pt is resting comfortable in NAD. Denies N/V/F/C.      Allergies    No Known Allergies    Intolerances        Vital Signs Last 24 Hrs  T(C): 36.7 (19 Sep 2019 05:18), Max: 36.8 (18 Sep 2019 14:07)  T(F): 98.1 (19 Sep 2019 05:18), Max: 98.2 (18 Sep 2019 14:07)  HR: 87 (19 Sep 2019 05:18) (70 - 87)  BP: 109/67 (19 Sep 2019 05:18) (109/67 - 131/75)  BP(mean): --  RR: 17 (19 Sep 2019 05:18) (17 - 18)  SpO2: 97% (19 Sep 2019 05:18) (96% - 100%)    LABS:                        16.8   12.99 )-----------( 311      ( 19 Sep 2019 04:40 )             52.6     09-19    138  |  101  |  14  ----------------------------<  123<H>  4.1   |  22  |  0.79    Ca    9.6      19 Sep 2019 04:40  Phos  2.4     09-18  Mg     1.9     09-18    TPro  7.6  /  Alb  3.6  /  TBili  0.5  /  DBili  x   /  AST  16  /  ALT  11  /  AlkPhos  83  09-19    PT/INR - ( 19 Sep 2019 04:40 )   PT: 12.2 SEC;   INR: 1.07          PTT - ( 19 Sep 2019 04:40 )  PTT:35.7 SEC    CAPILLARY BLOOD GLUCOSE      POCT Blood Glucose.: 120 mg/dL (19 Sep 2019 06:03)  POCT Blood Glucose.: 140 mg/dL (18 Sep 2019 22:01)  POCT Blood Glucose.: 144 mg/dL (18 Sep 2019 18:08)  POCT Blood Glucose.: 140 mg/dL (18 Sep 2019 12:49)      Lower Extremity Physical Exam:  Vascular: DP/PT 1/4 right; non palpable left, CFT <3 seconds B/L, Temperature gradient warm to warm left, warm to cold right  Neuro: Epicritic sensation present to the level of toes B/L  Wound #1:   Location: left foot lateral 5th met head  Size: 3 x 2 cm   Depth: down to subQ  Wound bed: necrotic/ischemic  Drainage: none  Odor: malodorous  Periwound: macerated  Etiology: friction    RADIOLOGY & ADDITIONAL TESTS:    EXAM: MR FOOT KELBY IC LT       PROCEDURE DATE: Sep 18 2019         INTERPRETATION: CLINICAL INDICATION: left ankle/foot infection; evaluate   for underlying abscess and/or osteomyelitis     TECHNIQUE:   Multiplanar and multisequence left foot MRI performed before and after   administration of 10 cc of Gadavist IV without reported complications and   without discard. No prior MRI studies available for comparison. Reviewed in   conjunction with left ankle and foot radiographs from previous day.     FINDINGS:   Focal small lateral forefoot soft tissue ulceration over 5th MTP region. No   discrete circumscribed rim-enhancing uniform fluid signal intensity   collections seen in the region or elsewhere to indicate a discrete drainable   abscess.     Enhancing patchy marrow signal abnormality in the 5th metatarsal head and   5th proximal phalanx subjacent to the ulceration suspicious for   osteomyelitis. No additional enhancing marrow signal abnormalities to   suspect an additional areas of osteomyelitis.     Redemonstrated old healed posttraumatic distal tibiofibular deformity with   tibiotalar arthritic change. No dislocations or acute appearing fractures.     Flexor and extensor tendons are normal in course caliber and signal.     Generalized increased T2 signal with mild postcontrast enhancement involving   the plantar musculature however without swelling/enlargement reflect a   nonspecific myopathy     No discrete osseous or soft tissue mass lesions.     IMPRESSION:   Lateral forefoot soft tissue ulceration over 5th MTP region with underlying   enhancing marrow signal abnormalities suspicious for osteomyelitis involving   5th metatarsal head and proximal phalanx. No discrete drainable abscess   collections.     Nonspecific plantar myopathy.     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                   SHAN HAND M.D., ATTENDING RADIOLOGIST   This document has been electronically signed. Sep 18 2019 5:40PM

## 2019-09-19 NOTE — PROGRESS NOTE ADULT - PROBLEM SELECTOR PROBLEM 2
Lactic acidemia Type 2 diabetes mellitus with diabetic neuropathy, with long-term current use of insulin Ulcer of left foot, unspecified ulcer stage

## 2019-09-19 NOTE — CONSULT NOTE ADULT - ASSESSMENT
63M with CAD, HTN, long-standing diabetes that is poorly controlled (A1C=9.8) here with foot ulcer c/b infection and osteomyelitis.  Superficial culture is polymicrobial.  Vascular work up prior to surgical debridement.    Suggest:  - vancomycin/zosyn okay for now  - f/u sensitivities prior to adjusting antibiotics  - f/u angiogram      [id number]

## 2019-09-19 NOTE — PROGRESS NOTE ADULT - PROBLEM SELECTOR PLAN 3
- History of diabetes, uses insulin at home  - HgbA1c 9.3  - -170  - C/w ISS  - Monitor glucose levels - Blood pressure WNL  - Will hold lisinopril in anticipation of angiography/contrast administration - Venous duplex - L AVELINA decreased at 0.63, L TBI decreased at 0.33 - indicative of arterial disease, likely  - Vascular surgery to perform angiogram, tentative tomorrow

## 2019-09-19 NOTE — PROGRESS NOTE ADULT - SUBJECTIVE AND OBJECTIVE BOX
Patient is a 63y old  Male who presents with a chief complaint of Left foot ulcer (18 Sep 2019 16:52)      SUBJECTIVE / OVERNIGHT EVENTS:    MEDICATIONS  (STANDING):  aspirin 325 milliGRAM(s) Oral daily  clopidogrel Tablet 75 milliGRAM(s) Oral daily  collagenase Ointment 1 Application(s) Topical daily  dextrose 5%. 1000 milliLiter(s) (50 mL/Hr) IV Continuous <Continuous>  dextrose 50% Injectable 12.5 Gram(s) IV Push once  dextrose 50% Injectable 25 Gram(s) IV Push once  dextrose 50% Injectable 25 Gram(s) IV Push once  enoxaparin Injectable 40 milliGRAM(s) SubCutaneous daily  escitalopram 20 milliGRAM(s) Oral daily  influenza   Vaccine 0.5 milliLiter(s) IntraMuscular once  insulin lispro (HumaLOG) corrective regimen sliding scale   SubCutaneous three times a day before meals  insulin lispro (HumaLOG) corrective regimen sliding scale   SubCutaneous at bedtime  lidocaine 1% Injectable 20 milliLiter(s) Local Injection Once  lisinopril 5 milliGRAM(s) Oral daily  metoprolol succinate ER 50 milliGRAM(s) Oral daily  piperacillin/tazobactam IVPB.. 3.375 Gram(s) IV Intermittent every 8 hours  simvastatin 10 milliGRAM(s) Oral at bedtime  sodium chloride 0.9%. 1000 milliLiter(s) (75 mL/Hr) IV Continuous <Continuous>  vancomycin  IVPB 1250 milliGRAM(s) IV Intermittent every 12 hours    MEDICATIONS  (PRN):  dextrose 40% Gel 15 Gram(s) Oral once PRN Blood Glucose LESS THAN 70 milliGRAM(s)/deciliter  glucagon  Injectable 1 milliGRAM(s) IntraMuscular once PRN Glucose LESS THAN 70 milligrams/deciliter      Vital Signs Last 24 Hrs  T(C): 36.7 (19 Sep 2019 05:18), Max: 36.8 (18 Sep 2019 14:07)  T(F): 98.1 (19 Sep 2019 05:18), Max: 98.2 (18 Sep 2019 14:07)  HR: 87 (19 Sep 2019 05:18) (70 - 87)  BP: 109/67 (19 Sep 2019 05:18) (109/67 - 131/75)  BP(mean): --  RR: 17 (19 Sep 2019 05:18) (17 - 18)  SpO2: 97% (19 Sep 2019 05:18) (96% - 100%)  CAPILLARY BLOOD GLUCOSE      POCT Blood Glucose.: 120 mg/dL (19 Sep 2019 06:03)  POCT Blood Glucose.: 140 mg/dL (18 Sep 2019 22:01)  POCT Blood Glucose.: 144 mg/dL (18 Sep 2019 18:08)  POCT Blood Glucose.: 140 mg/dL (18 Sep 2019 12:49)  POCT Blood Glucose.: 120 mg/dL (18 Sep 2019 08:57)    I&O's Summary      PHYSICAL EXAM:  Vital Signs Last 24 Hrs  T(C): 36.7 (09-19-19 @ 05:18)  T(F): 98.1 (09-19-19 @ 05:18), Max: 98.2 (09-18-19 @ 14:07)  HR: 87 (09-19-19 @ 05:18) (70 - 87)  BP: 109/67 (09-19-19 @ 05:18)  BP(mean): --  RR: 17 (09-19-19 @ 05:18) (17 - 18)  SpO2: 97% (09-19-19 @ 05:18) (96% - 100%)  Wt(kg): --    Constitutional: NAD, awake and alert  EYES: EOMI  ENT:  Normal Hearing, no tonsillar exudates   Neck: Soft and supple , no thyromegaly   Respiratory: Breath sounds are clear bilaterally, No wheezing, rales or rhonchi  Cardiovascular: S1 and S2, regular rate and rhythm, no Murmurs, gallops or rubs, no JVD,    Gastrointestinal: Bowel Sounds present, soft, nontender, nondistended, no guarding, no rebound  Extremities: No cyanosis or clubbing; warm to touch  Vascular: 2+ peripheral pulses lower ex  Neurological: No focal deficits, CN II-XII intact bilaterally, sensation to light touch intact in all extremities, gait intact. Pupils are equally reactive to light and symmetrical in size.   Musculoskeletal: 5/5 strength b/l upper and lower extremities; no joint swelling.  Skin: No rashes  Psych: no depression or anhedonia, AAOx3  HEME: no bruises, no nose bleeds      LABS:                        16.8   12.99 )-----------( 311      ( 19 Sep 2019 04:40 )             52.6     09-19    138  |  101  |  14  ----------------------------<  123<H>  4.1   |  22  |  0.79    Ca    9.6      19 Sep 2019 04:40  Phos  2.4     09-18  Mg     1.9     09-18    TPro  7.6  /  Alb  3.6  /  TBili  0.5  /  DBili  x   /  AST  16  /  ALT  11  /  AlkPhos  83  09-19    PT/INR - ( 19 Sep 2019 04:40 )   PT: 12.2 SEC;   INR: 1.07          PTT - ( 19 Sep 2019 04:40 )  PTT:35.7 SEC          RADIOLOGY & ADDITIONAL TESTS:    Imaging Personally Reviewed:    Consultant(s) Notes Reviewed:      Care Discussed with Consultants/Other Providers: Patient is a 63y old  Male who presents with a chief complaint of Left foot ulcer (18 Sep 2019 16:52)      SUBJECTIVE / OVERNIGHT EVENTS: Patient evaluated at bedside. TWYLA. Patient states that he is not experiencing any pain in his left foot because it feels numb. He denies any fever, SOB, lightheadedness, dizziness, HA.       CONSTITUTIONAL: No weakness, fevers or chills  EYES/ENT: No visual changes;  No vertigo or throat pain   NECK: No pain or stiffness  RESPIRATORY: No cough, wheezing, hemoptysis; No shortness of breath  CARDIOVASCULAR: No chest pain or palpitations  GASTROINTESTINAL: No abdominal or epigastric pain. No nausea, vomiting, or hematemesis; No diarrhea or constipation. No melena or hematochezia.  GENITOURINARY: No dysuria, frequency or hematuria  NEUROLOGICAL: No numbness or weakness  SKIN: No itching, burning, rashes, or lesions   All other review of systems is negative unless indicated above.    MEDICATIONS  (STANDING):  aspirin 325 milliGRAM(s) Oral daily  clopidogrel Tablet 75 milliGRAM(s) Oral daily  collagenase Ointment 1 Application(s) Topical daily  dextrose 5%. 1000 milliLiter(s) (50 mL/Hr) IV Continuous <Continuous>  dextrose 50% Injectable 12.5 Gram(s) IV Push once  dextrose 50% Injectable 25 Gram(s) IV Push once  dextrose 50% Injectable 25 Gram(s) IV Push once  enoxaparin Injectable 40 milliGRAM(s) SubCutaneous daily  escitalopram 20 milliGRAM(s) Oral daily  influenza   Vaccine 0.5 milliLiter(s) IntraMuscular once  insulin lispro (HumaLOG) corrective regimen sliding scale   SubCutaneous three times a day before meals  insulin lispro (HumaLOG) corrective regimen sliding scale   SubCutaneous at bedtime  lidocaine 1% Injectable 20 milliLiter(s) Local Injection Once  lisinopril 5 milliGRAM(s) Oral daily  metoprolol succinate ER 50 milliGRAM(s) Oral daily  piperacillin/tazobactam IVPB.. 3.375 Gram(s) IV Intermittent every 8 hours  simvastatin 10 milliGRAM(s) Oral at bedtime  sodium chloride 0.9%. 1000 milliLiter(s) (75 mL/Hr) IV Continuous <Continuous>  vancomycin  IVPB 1250 milliGRAM(s) IV Intermittent every 12 hours    MEDICATIONS  (PRN):  dextrose 40% Gel 15 Gram(s) Oral once PRN Blood Glucose LESS THAN 70 milliGRAM(s)/deciliter  glucagon  Injectable 1 milliGRAM(s) IntraMuscular once PRN Glucose LESS THAN 70 milligrams/deciliter      Vital Signs Last 24 Hrs  T(C): 36.7 (19 Sep 2019 05:18), Max: 36.8 (18 Sep 2019 14:07)  T(F): 98.1 (19 Sep 2019 05:18), Max: 98.2 (18 Sep 2019 14:07)  HR: 87 (19 Sep 2019 05:18) (70 - 87)  BP: 109/67 (19 Sep 2019 05:18) (109/67 - 131/75)  BP(mean): --  RR: 17 (19 Sep 2019 05:18) (17 - 18)  SpO2: 97% (19 Sep 2019 05:18) (96% - 100%)  CAPILLARY BLOOD GLUCOSE      POCT Blood Glucose.: 120 mg/dL (19 Sep 2019 06:03)  POCT Blood Glucose.: 140 mg/dL (18 Sep 2019 22:01)  POCT Blood Glucose.: 144 mg/dL (18 Sep 2019 18:08)  POCT Blood Glucose.: 140 mg/dL (18 Sep 2019 12:49)  POCT Blood Glucose.: 120 mg/dL (18 Sep 2019 08:57)    I&O's Summary      PHYSICAL EXAM:  Vital Signs Last 24 Hrs  T(C): 36.7 (09-19-19 @ 05:18)  T(F): 98.1 (09-19-19 @ 05:18), Max: 98.2 (09-18-19 @ 14:07)  HR: 87 (09-19-19 @ 05:18) (70 - 87)  BP: 109/67 (09-19-19 @ 05:18)  BP(mean): --  RR: 17 (09-19-19 @ 05:18) (17 - 18)  SpO2: 97% (09-19-19 @ 05:18) (96% - 100%)  Wt(kg): --    Constitutional: NAD, awake and alert  EYES: EOMI  ENT:  Normal Hearing, no tonsillar exudates   Neck: Soft and supple , no thyromegaly   Respiratory: Breath sounds are clear bilaterally, No wheezing, rales or rhonchi  Cardiovascular: S1 and S2, regular rate and rhythm, no Murmurs, gallops or rubs, no JVD,    Gastrointestinal: Bowel Sounds present, soft, nontender, nondistended, no guarding, no rebound  Extremities: No cyanosis or clubbing; warm to touch  Vascular: 2+ peripheral pulses lower ex  Neurological: No focal deficits, CN II-XII intact bilaterally, sensation to light touch intact in all extremities, gait intact. Pupils are equally reactive to light and symmetrical in size.   Musculoskeletal: 5/5 strength b/l upper and lower extremities; no joint swelling.  Skin: No rashes  Psych: no depression or anhedonia, AAOx3  HEME: no bruises, no nose bleeds      LABS:                        16.8   12.99 )-----------( 311      ( 19 Sep 2019 04:40 )             52.6     09-19    138  |  101  |  14  ----------------------------<  123<H>  4.1   |  22  |  0.79    Ca    9.6      19 Sep 2019 04:40  Phos  2.4     09-18  Mg     1.9     09-18    TPro  7.6  /  Alb  3.6  /  TBili  0.5  /  DBili  x   /  AST  16  /  ALT  11  /  AlkPhos  83  09-19    PT/INR - ( 19 Sep 2019 04:40 )   PT: 12.2 SEC;   INR: 1.07          PTT - ( 19 Sep 2019 04:40 )  PTT:35.7 SEC          RADIOLOGY & ADDITIONAL TESTS:    Imaging Personally Reviewed:    Consultant(s) Notes Reviewed:      Care Discussed with Consultants/Other Providers: Patient is a 63y old  Male who presents with a chief complaint of Left foot ulcer (18 Sep 2019 16:52)      SUBJECTIVE / OVERNIGHT EVENTS: Patient evaluated at bedside. TWYLA. Patient states that he is not experiencing any pain in his left foot because it feels numb. He denies any fever, SOB, lightheadedness, dizziness, HA. No other complaints or concerns presented at the time of interview.      CONSTITUTIONAL: No weakness, fevers or chills  EYES/ENT: No visual changes;  No vertigo or throat pain   NECK: No pain or stiffness  RESPIRATORY: No cough, wheezing, hemoptysis; No shortness of breath  CARDIOVASCULAR: No chest pain or palpitations  GASTROINTESTINAL: No abdominal or epigastric pain. No nausea, vomiting, or hematemesis; No diarrhea or constipation. No melena or hematochezia.  GENITOURINARY: No dysuria, frequency or hematuria  NEUROLOGICAL: + slight LLE numbness.   SKIN: No itching, burning, rashes, or lesions   All other review of systems is negative unless indicated above.    CONSTITUTIONAL: No weakness, fevers or chills  EYES/ENT: No visual changes;  No vertigo or throat pain   NECK: No pain or stiffness  RESPIRATORY: No cough, wheezing, hemoptysis; No shortness of breath  CARDIOVASCULAR: No chest pain or palpitations  GASTROINTESTINAL: No abdominal or epigastric pain. No nausea, vomiting, or hematemesis; No diarrhea or constipation. No melena or hematochezia.  GENITOURINARY: No dysuria, frequency or hematuria  NEUROLOGICAL: No numbness or weakness  SKIN: No itching, burning, rashes, or lesions   All other review of systems is negative unless indicated above.    MEDICATIONS  (STANDING):  aspirin 325 milliGRAM(s) Oral daily  clopidogrel Tablet 75 milliGRAM(s) Oral daily  collagenase Ointment 1 Application(s) Topical daily  dextrose 5%. 1000 milliLiter(s) (50 mL/Hr) IV Continuous <Continuous>  dextrose 50% Injectable 12.5 Gram(s) IV Push once  dextrose 50% Injectable 25 Gram(s) IV Push once  dextrose 50% Injectable 25 Gram(s) IV Push once  enoxaparin Injectable 40 milliGRAM(s) SubCutaneous daily  escitalopram 20 milliGRAM(s) Oral daily  influenza   Vaccine 0.5 milliLiter(s) IntraMuscular once  insulin lispro (HumaLOG) corrective regimen sliding scale   SubCutaneous three times a day before meals  insulin lispro (HumaLOG) corrective regimen sliding scale   SubCutaneous at bedtime  lidocaine 1% Injectable 20 milliLiter(s) Local Injection Once  lisinopril 5 milliGRAM(s) Oral daily  metoprolol succinate ER 50 milliGRAM(s) Oral daily  piperacillin/tazobactam IVPB.. 3.375 Gram(s) IV Intermittent every 8 hours  simvastatin 10 milliGRAM(s) Oral at bedtime  sodium chloride 0.9%. 1000 milliLiter(s) (75 mL/Hr) IV Continuous <Continuous>  vancomycin  IVPB 1250 milliGRAM(s) IV Intermittent every 12 hours    MEDICATIONS  (PRN):  dextrose 40% Gel 15 Gram(s) Oral once PRN Blood Glucose LESS THAN 70 milliGRAM(s)/deciliter  glucagon  Injectable 1 milliGRAM(s) IntraMuscular once PRN Glucose LESS THAN 70 milligrams/deciliter      Vital Signs Last 24 Hrs  T(C): 36.7 (19 Sep 2019 05:18), Max: 36.8 (18 Sep 2019 14:07)  T(F): 98.1 (19 Sep 2019 05:18), Max: 98.2 (18 Sep 2019 14:07)  HR: 87 (19 Sep 2019 05:18) (70 - 87)  BP: 109/67 (19 Sep 2019 05:18) (109/67 - 131/75)  BP(mean): --  RR: 17 (19 Sep 2019 05:18) (17 - 18)  SpO2: 97% (19 Sep 2019 05:18) (96% - 100%)  CAPILLARY BLOOD GLUCOSE      POCT Blood Glucose.: 120 mg/dL (19 Sep 2019 06:03)  POCT Blood Glucose.: 140 mg/dL (18 Sep 2019 22:01)  POCT Blood Glucose.: 144 mg/dL (18 Sep 2019 18:08)  POCT Blood Glucose.: 140 mg/dL (18 Sep 2019 12:49)  POCT Blood Glucose.: 120 mg/dL (18 Sep 2019 08:57)    I&O's Summary      PHYSICAL EXAM:  Vital Signs Last 24 Hrs  T(C): 36.7 (09-19-19 @ 05:18)  T(F): 98.1 (09-19-19 @ 05:18), Max: 98.2 (09-18-19 @ 14:07)  HR: 87 (09-19-19 @ 05:18) (70 - 87)  BP: 109/67 (09-19-19 @ 05:18)  BP(mean): --  RR: 17 (09-19-19 @ 05:18) (17 - 18)  SpO2: 97% (09-19-19 @ 05:18) (96% - 100%)  Wt(kg): --    Constitutional: NAD, awake and alert  EYES: +PERRL, +EOMI, no scleral icterus  ENT: Moist oral mucosa  Respiratory: CTAB; no wheezing, rales or rhonchi  Cardiovascular: +S1/S2; RRR; no murmurs, gallops or rubs    Gastrointestinal: Soft, nontender, nondistended; +BS  Extremities: No cyanosis or clubbing; warm to touch  Vascular: 2+ peripheral pulses x b/l LE  Neurological: Decreased sensation in the LLE compared to the RLE, CN II-XII intact bilaterally. A&Ox3.    Musculoskeletal: 5/5 strength b/l upper and lower extremities; no joint swelling. Nontender LLE.  Skin: Blackened ulcer on L foot with resolving mild erythema      LABS:                        16.8   12.99 )-----------( 311      ( 19 Sep 2019 04:40 )             52.6     09-19    138  |  101  |  14  ----------------------------<  123<H>  4.1   |  22  |  0.79    Ca    9.6      19 Sep 2019 04:40  Phos  2.4     09-18  Mg     1.9     09-18    TPro  7.6  /  Alb  3.6  /  TBili  0.5  /  DBili  x   /  AST  16  /  ALT  11  /  AlkPhos  83  09-19    PT/INR - ( 19 Sep 2019 04:40 )   PT: 12.2 SEC;   INR: 1.07          PTT - ( 19 Sep 2019 04:40 )  PTT:35.7 SEC          RADIOLOGY & ADDITIONAL TESTS:    Imaging Personally Reviewed:    Consultant(s) Notes Reviewed:      Care Discussed with Consultants/Other Providers:

## 2019-09-19 NOTE — PROGRESS NOTE ADULT - PROBLEM SELECTOR PLAN 5
- S/p three stents  - C/w ASA, plavix, and statin - DVT: Loxenox  - Diet: DASH/TLC consistent carbohydrates - Blood pressure WNL  - Will hold lisinopril in anticipation of angiography/contrast administration

## 2019-09-19 NOTE — PROGRESS NOTE ADULT - ASSESSMENT
63M with PAD and nonhealing left 5th metatarsal wound.    - MRI results noted - findings suspicious for OM  - Plan for angio of LLE today  - Please document medical optimization for procedure.    NENO Veronica, PGY2  Vascular Surgery (C Team Surgery)  [id number] with any questions 63M with PAD and nonhealing left 5th metatarsal wound.    - MRI results noted - findings suspicious for OM  - Plan for angio of LLE tomorrow (9.20.19)  - NPO after midnight  - AM labs  - Please document medical optimization for procedure.    NENO Veronica, PGY2  Vascular Surgery (C Team Surgery)  [id number] with any questions

## 2019-09-19 NOTE — PROGRESS NOTE ADULT - PROBLEM SELECTOR PLAN 2
- Elevated at 2.4  - Repeat lactate 1.5; resolved - History of diabetes, uses insulin at home  - HgbA1c 9.3  - -150  - C/w ISS  - Monitor glucose levels - Ulcer on LLE x 3 weeks, poorly healing ulcer  - Venous duplex - L AVELINA decreased at 0.63, L TBI decreased at 0.33 - indicative of arterial disease  - To undergo angiography of LLE per vascular surgery  - F/u wound cx  - ID consulted, c/w vancomycin and zosyn  - Podiatry following

## 2019-09-19 NOTE — PROGRESS NOTE ADULT - SUBJECTIVE AND OBJECTIVE BOX
Physical Exam    General: NAD, Lying in bed comfortably  Neuro: AAO  HEENT: NC/AT, EOMI  Resp: Good effort, No distress  GI/Abd: Soft, NTND, no rebound/guarding, no masses palpated  Vascular: B/l radial pulses palpable, Dopplerable DP/PT bilaterally. ~3cm lesion to lateral aspect of foot with central tracking of erythema onto dorsal surface. Outlined in pen from someone else's exam. Erythema does not extend beyond this border.  Skin: Intact, no breakdown  Musculoskeletal: All 4 extremities moving spontaneously, no limitations

## 2019-09-19 NOTE — CONSULT NOTE ADULT - SUBJECTIVE AND OBJECTIVE BOX
Ale Hauser - [phone]    Patient is a 63y old  Male who presents with a chief complaint of Left foot ulcer (19 Sep 2019 10:04)    HPI:  63M with diabetes at least since 2005 with neuropathy, HTN, CAD s/p stents admitted 9/17/19 after referred to ER by his podiatrist for a infected left foot ulcer.  Developed an blister after trauma to the foot several weeks ago. It ulcerated and then eventually became malodorous and started to bleed.  Self initiated amoxicillin for 4 days prior to seeing podiatrist.  Briefly was off DM medications due to insurance issues.  He denies fevers, chills, chest pain, shortness of breath, cough, nausea, vomiting, diarrhea, abdominal pain, diarrhea, or urinary symptoms.  In the ER, was started on vancomycin and zosyn.  Bedside debridement by podiatry.  Cultures so far with staph aureus, Kpna, CoNS.  MRI confirmed OM. Going for angiogram tomorrow.    ID asked to help management.     prior hospital charts reviewed [x  ]  primary team notes reviewed [ x ]  other consultant notes reviewed [ x ]    PAST MEDICAL & SURGICAL HISTORY:  Stented Coronary Artery  COPD (Chronic Obstructive Pulmonary Disease)  Old myocardial infarction  Diabetes Mellitus Type II  Hypertension  H/O: Varicose Veins: laser surgery  Knee Arthroplasty  Ankle Fracture: left repaired    Allergies  No Known Allergies    ANTIMICROBIALS:  piperacillin/tazobactam IVPB.. 3.375 every 8 hours (9/17-)  vancomycin  IVPB 1250 every 12 hours (9/17-)    MEDICATIONS  (STANDING):  aspirin enteric coated 81 daily  clopidogrel Tablet 75 daily  enoxaparin Injectable 40 daily  escitalopram 20 daily  influenza   Vaccine 0.5 once  insulin lispro (HumaLOG) corrective regimen sliding scale  three times a day before meals  insulin lispro (HumaLOG) corrective regimen sliding scale  at bedtime  lisinopril 5 daily  metoprolol succinate ER 50 daily  simvastatin 10 at bedtime  PRN  dextrose 40% Gel 15 Gram(s) Oral once PRN  glucagon  Injectable 1 milliGRAM(s) IntraMuscular once PRN    SOCIAL HISTORY:   smoker    FAMILY HISTORY:  mother had diabetes    REVIEW OF SYSTEMS  [  ] ROS unobtainable because:    [ x ] All other systems negative except as noted below:	    Constitutional:  [ ] fever [ ] chills  [ ] weight loss  [ ] weakness  Skin:  [ ] rash [ ] phlebitis	  Eyes: [ ] icterus [ ] pain  [ ] discharge	  ENMT: [ ] sore throat  [ ] thrush [ ] ulcers [ ] exudates  Respiratory: [ ] dyspnea [ ] hemoptysis [ ] cough [ ] sputum	  Cardiovascular:  [ ] chest pain [ ] palpitations [ ] edema	  Gastrointestinal:  [ ] nausea [ ] vomiting [ ] diarrhea [ ] constipation [ ] pain	  Genitourinary:  [ ] dysuria [ ] frequency [ ] hematuria [ ] discharge [ ] flank pain  [ ] incontinence  Musculoskeletal:  [ ] myalgias [ ] arthralgias [ ] arthritis  [ ] back pain  Neurological:  [ ] headache [ ] seizures  [ ] confusion/altered mental status  Psychiatric:  [ ] anxiety [ ] depression	  Hematology/Lymphatics:  [ ] lymphadenopathy  Endocrine:  [ ] adrenal [ ] thyroid  Allergic/Immunologic:	 [ ] transplant [ ] seasonal    Vital Signs Last 24 Hrs  T(F): 98 (09-19-19 @ 13:58), Max: 98.4 (09-17-19 @ 22:30)  Vital Signs Last 24 Hrs  HR: 86 (09-19-19 @ 13:58) (70 - 87)  BP: 119/77 (09-19-19 @ 13:58) (109/67 - 131/75)  RR: 18 (09-19-19 @ 13:58)  SpO2: 98% (09-19-19 @ 13:58) (96% - 98%)  Wt(kg): --    PHYSICAL EXAM:  General: non-toxic  HEAD/EYES: anicteric  ENT:  supple  Cardiovascular:   S1, S2  Respiratory:  clear bilaterally  GI:  soft, non-tender, normal bowel sounds  :  no CVA tenderness   Musculoskeletal:  no synovitis  Neurologic:  grossly non-focal though decreased sensation b/l feet  Skin:  eschar left lateral foot  Psychiatric:  appropriate affect  Vascular:  no phlebitis    Hemoglobin A1C, Whole Blood: 9.3:  % (09.18.19 @ 05:39)    Sedimentation Rate, Erythrocyte: 51 (09-17-19)  C-Reactive Protein, Serum: 28.6 (09-17-19)                        16.8   12.99 )-----------( 311      ( 19 Sep 2019 04:40 )             52.6   138  |  101  |  14  ----------------------------<  123<H>  4.1   |  22  |  0.79    Ca    9.6      19 Sep 2019 04:40  Phos  2.4     09-18  Mg     1.9     09-18    TPro  7.6  /  Alb  3.6  /  TBili  0.5  /  DBili  x   /  AST  16  /  ALT  11  /  AlkPhos  83  09-19    MICROBIOLOGY:  Vancomycin Level, Trough: 7.8 (09-19 @ 04:40)    Culture - Abscess with Gram Stain (collected 17 Sep 2019 16:37)  Source: OTHER  Preliminary Report (19 Sep 2019 12:40):    MODERATE    KLP^Klebsiella pneumoniae    STAU^Staphylococcus aureus    STCN^Staphylococcus sp.,coag neg    RADIOLOGY:  imaging below personally reviewed    MR Foot w/wo IV Cont, Left (09.18.19 @ 17:29)   IMPRESSION:  Lateral forefoot soft tissue ulceration over 5th MTP region with underlying enhancing marrow signal abnormalities suspicious for osteomyelitis involving 5th metatarsal head and proximal phalanx. No discrete drainable abscess collections.  Nonspecific plantar myopathy.  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
Cardiology/Vascular Medicine Inpatient Consultation Note    HISTORY OF PRESENT ILLNESS:  Patient is a 64 yo man HTN, T2DM (on insulin), CAD s/p PCI was sent in by podiatrist for a non-healing left foot ulcer. The patient states that he developed a blister on his left foot about 2-3 weeks ago. Since he has peripheral neuropathy, he cannot feel any sensation in his feet. So, he could not feel that the blister had developed into an ulcer. It eventually became malodorous and started to bleed. He tried amoxicillin 875 mg BID for four days and saw that there was no improvement. So, he went to see his podiatrist who told him to go to the ED. The patient believes the ulcer was caused by new shoes he bought three weeks ago, as he had no associated trauma. He is supposed to see his podiatrist every three months, but has not seen him in one year. He has been compliant with his insulin regimen, but there was a period when he did not have his medications for two weeks due to insurance issues. His blood sugar elevated to around 400 mg/dL.      The patient received 1g vancomycin x1, zosyn x1, lidocaine 1% injection, and collagenase ointment. (17 Sep 2019 16:08)    Vascular Sx team plans to perform left lower extremity angiography tomorrow.    From the cardiac perspective, the patient reports having no exertional limitations.  He denies having chest pain, dyspnea, palpitations, orthopnea/PND, syncope/presyncope.  He appears to be euvolemic.    Echocardiogram performed at [address] office in late August 2019 demonstrated normal biventricular systolic function.    From the cardiac perspective, the patient may proceed with LLE angiogram without the need for additional cardiac testing or risk stratification.  Currently on appropriate medical therapy--on DAPT and statin.  With regards to DAPT, can consider low dose aspirin rather than full-dose ASA which he is on now.  Would also recommend that he take atorvastatin 40 mg instead of 80 mg.    Allergies  No Known Allergies    MEDICATIONS:  clopidogrel Tablet 75 milliGRAM(s) Oral daily  enoxaparin Injectable 40 milliGRAM(s) SubCutaneous daily  lisinopril 5 milliGRAM(s) Oral daily  metoprolol succinate ER 50 milliGRAM(s) Oral daily  piperacillin/tazobactam IVPB.. 3.375 Gram(s) IV Intermittent every 8 hours  vancomycin  IVPB 1000 milliGRAM(s) IV Intermittent every 12 hours  aspirin 325 milliGRAM(s) Oral daily  dextrose 40% Gel 15 Gram(s) Oral once PRN  dextrose 50% Injectable 12.5 Gram(s) IV Push once  dextrose 50% Injectable 25 Gram(s) IV Push once  dextrose 50% Injectable 25 Gram(s) IV Push once  glucagon  Injectable 1 milliGRAM(s) IntraMuscular once PRN  insulin lispro (HumaLOG) corrective regimen sliding scale   SubCutaneous three times a day before meals  insulin lispro (HumaLOG) corrective regimen sliding scale   SubCutaneous at bedtime  simvastatin 10 milliGRAM(s) Oral at bedtime  collagenase Ointment 1 Application(s) Topical daily  dextrose 5%. 1000 milliLiter(s) IV Continuous <Continuous>  influenza   Vaccine 0.5 milliLiter(s) IntraMuscular once  sodium chloride 0.9%. 1000 milliLiter(s) IV Continuous <Continuous>    PAST MEDICAL & SURGICAL HISTORY:  Stented Coronary Artery  COPD (Chronic Obstructive Pulmonary Disease)  Old myocardial infarction  Diabetes Mellitus Type II  Hypertension  H/O: Varicose Veins: laser surgery  Knee Arthroplasty  Ankle Fracture: left repaired    FAMILY HISTORY:  No pertinent family history in first degree relatives    SOCIAL HISTORY:    NC    REVIEW OF SYSTEMS:  As per HPI    PHYSICAL EXAM:  T(C): 36.8 (09-18-19 @ 14:07), Max: 36.9 (09-17-19 @ 22:30)  HR: 78 (09-18-19 @ 14:07) (70 - 88)  BP: 127/67 (09-18-19 @ 14:07) (25/67 - 135/73)  RR: 18 (09-18-19 @ 14:07) (13 - 18)  SpO2: 100% (09-18-19 @ 14:07) (96% - 100%)  Wt(kg): --  I&O's Summary    17 Sep 2019 07:01  -  18 Sep 2019 07:00  --------------------------------------------------------  IN: 0 mL / OUT: 50 mL / NET: -50 mL      Appearance: NAD  HEENT:   Normal oral mucosa, PERRL, EOMI	  Cardiovascular: Normal S1 S2, No JVD, No murmurs, No edema  Respiratory: Decreased breath sounds bilateral bases	  Psychiatry: Awake, alert  Gastrointestinal:  Soft, Non-tender, + BS	  Skin: No rashes, No ecchymoses, No cyanosis	  Neurologic: Non-focal  Extremities: +Dressing Normal range of motion, No clubbing, cyanosis or edema  Vascular: Peripheral pulses palpable 2+ bilaterally      LABS:	 	                          15.9   15.22 )-----------( 309      ( 18 Sep 2019 05:39 )             47.5     09-18    135  |  101  |  13  ----------------------------<  133<H>  4.1   |  24  |  0.85  09-17    137  |  100  |  18  ----------------------------<  141<H>  4.8   |  26  |  0.83    Ca    9.2      18 Sep 2019 05:39  Ca    10.0      17 Sep 2019 12:10  Phos  2.4     09-18  Mg     1.9     09-18    TPro  7.9  /  Alb  4.1  /  TBili  0.3  /  DBili  x   /  AST  15  /  ALT  10  /  AlkPhos  101  09-17    < from: Xray Foot AP + Lateral + Oblique, Left (09.17.19 @ 12:54) >    EXAM:  RAD FOOT MIN 3 VIEWS LT      EXAM:  RAD ANKLE MIN 3 VIEWS LEFT        PROCEDURE DATE:  Sep 17 2019         INTERPRETATION:  CLINICAL INDICATION: left ankle/foot ulceration;   evaluate for deep infection    EXAM:  Frontal, oblique, and lateral left ankle and foot from 9/17/2019 at 1254.   IMPRESSION no similar    IMPRESSION:  Suggestion of a shallow posterior heel ulceration. No tracking gas   collections beyond this region and no gross radiographic evidence for   underlying osteomyelitis.    Chronic corticated ossification adjacent to medial malleolar tip, chronic   mild bony productive changes along talofibular margins, and thick   bridging ossification across distal tibiofibular syndesmotic space   consistent with old posttraumaticchange.    No dislocations or acute appearing fractures.    Tarsometatarsal alignment maintained without evidence for a Lisfranc   injury.    Mild tibiotalar arthrosis with subarticular cystic change along the   tibial plafond also apparent. Congenitally fused 5th DIP joint Preserved   remaining joint spaces and no joint margin erosions.     Thick plantar calcaneal enthesophyte with adjacent separate discontinuous   chronic ossifications.    No discrete lytic or blastic lesions.      SHAN HAND M.D., ATTENDING RADIOLOGIST  This document has been electronically signed. Sep 17 2019  1:52PM
GENERAL SURGERY CONSULT NOTE    Chief Complaint: Left foot ulcer  HPI: 63M with 50PY smoking history presents with 3 weeks of nonhealing LLE metatarsal wound. Says he has decreased sensation to the foot and thinks this might be 2/2 his shoes. No HA, CP, SOB, N/V/D/C/F/C. Smoke 1PPD x 50 years. Unable to walk long distances 2/2 pain. History of cardiac caths with stents x 3. Followed by podiatry who performed debridement of wound.     PAST MEDICAL & SURGICAL HISTORY:  Stented Coronary Artery  COPD (Chronic Obstructive Pulmonary Disease)  Old myocardial infarction  Diabetes Mellitus Type II  Hypertension  H/O: Varicose Veins: laser surgery  Knee Arthroplasty  Ankle Fracture: left repaired    MEDICATIONS  (STANDING):  aspirin 325 milliGRAM(s) Oral daily  clopidogrel Tablet 75 milliGRAM(s) Oral daily  collagenase Ointment 1 Application(s) Topical daily  dextrose 5%. 1000 milliLiter(s) (50 mL/Hr) IV Continuous <Continuous>  dextrose 50% Injectable 12.5 Gram(s) IV Push once  dextrose 50% Injectable 25 Gram(s) IV Push once  dextrose 50% Injectable 25 Gram(s) IV Push once  enoxaparin Injectable 40 milliGRAM(s) SubCutaneous daily  influenza   Vaccine 0.5 milliLiter(s) IntraMuscular once  insulin lispro (HumaLOG) corrective regimen sliding scale   SubCutaneous three times a day before meals  insulin lispro (HumaLOG) corrective regimen sliding scale   SubCutaneous at bedtime  lidocaine 1% Injectable 20 milliLiter(s) Local Injection Once  lisinopril 5 milliGRAM(s) Oral daily  metoprolol succinate ER 50 milliGRAM(s) Oral daily  piperacillin/tazobactam IVPB.. 3.375 Gram(s) IV Intermittent every 8 hours  potassium phosphate IVPB 15 milliMole(s) IV Intermittent once  simvastatin 10 milliGRAM(s) Oral at bedtime  sodium chloride 0.9%. 1000 milliLiter(s) (75 mL/Hr) IV Continuous <Continuous>  vancomycin  IVPB 1000 milliGRAM(s) IV Intermittent every 12 hours    MEDICATIONS  (PRN):  dextrose 40% Gel 15 Gram(s) Oral once PRN Blood Glucose LESS THAN 70 milliGRAM(s)/deciliter  glucagon  Injectable 1 milliGRAM(s) IntraMuscular once PRN Glucose LESS THAN 70 milligrams/deciliter    Allergies    No Known Allergies    Intolerances        SOCIAL HISTORY   Smoking: Yes  ETOH: No  Drugs: No  Other:    FAMILY HISTORY:  No pertinent family history in first degree relatives    Vital Signs Last 24 Hrs  T(C): 36.7 (18 Sep 2019 05:50), Max: 36.9 (17 Sep 2019 22:30)  T(F): 98.1 (18 Sep 2019 05:50), Max: 98.4 (17 Sep 2019 22:30)  HR: 78 (18 Sep 2019 05:50) (70 - 88)  BP: 25/67 (18 Sep 2019 05:50) (25/67 - 135/73)  BP(mean): --  RR: 17 (18 Sep 2019 05:50) (13 - 18)  SpO2: 100% (18 Sep 2019 05:50) (96% - 100%)    09-17 @ 07:01  -  09-18 @ 07:00  --------------------------------------------------------  IN: 0 mL / OUT: 50 mL / NET: -50 mL        Physical Exam    PHYSICAL EXAM:  General: NAD, Lying in bed comfortably  Neuro: AAO  HEENT: NC/AT, EOMI  Resp: Good effort, No distress  GI/Abd: Soft, NTND, no rebound/guarding, no masses palpated  Vascular: B/l radial pulses palpable, Dopplerable DP/PT bilaterally. ~3cm lesion to lateral aspect of foot with central tracking of erythema onto dorsal surface. Outlined in pen from someone else's exam. Erythema does not extend beyond this border.  Skin: Intact, no breakdown  Musculoskeletal: All 4 extremities moving spontaneously, no limitations    LABS                        15.9   15.22 )-----------( 309      ( 18 Sep 2019 05:39 )             47.5     09-18    135  |  101  |  13  ----------------------------<  133<H>  4.1   |  24  |  0.85    Ca    9.2      18 Sep 2019 05:39  Phos  2.4     09-18  Mg     1.9     09-18    TPro  7.9  /  Alb  4.1  /  TBili  0.3  /  DBili  x   /  AST  15  /  ALT  10  /  AlkPhos  101  09-17    LIVER FUNCTIONS - ( 17 Sep 2019 12:10 )  Alb: 4.1 g/dL / Pro: 7.9 g/dL / ALK PHOS: 101 u/L / ALT: 10 u/L / AST: 15 u/L / GGT: x           PT/INR - ( 17 Sep 2019 12:10 )   PT: 11.7 SEC;   INR: 1.05          PTT - ( 17 Sep 2019 12:10 )  PTT:37.4 SEC      RADIOLOGY & ADDITIONAL STUDIES:    AVELINA/PVR: L AVELINA 0.63, L TBI 0.33    Assessment/Plan: 63M with PAD and nonhealing left 5th metatarsal wound.    1. MRI ordered to r/o abscess vs OM  2. Patient to get angio tomorrow of LLE  3. Will pre-op and consent for procedure today. Please document medical optimization for procedure. I made the patient NPO after midnight.  4. Discussed with vascular fellow who discussed with Dr. Hinton.
Podiatry pager #: [phone] ([place])/ [phone] ([facility])    Patient is a 63y old  Male who presents with a chief complaint of     HPI:      PAST MEDICAL & SURGICAL HISTORY:  Coronary Artery Disease  Stented Coronary Artery  COPD (Chronic Obstructive Pulmonary Disease)  Old myocardial infarction  Diabetes Mellitus Type II  Hyperlipemia  Hypertension  H/O: Varicose Veins: laser surgery  Knee Arthroplasty  Ankle Fracture: left repaired      MEDICATIONS  (STANDING):  collagenase Ointment 1 Application(s) Topical daily  lidocaine 1% Injectable 20 milliLiter(s) Local Injection Once  vancomycin  IVPB 1000 milliGRAM(s) IV Intermittent once    MEDICATIONS  (PRN):      Allergies    No Known Allergies    Intolerances        VITALS:    Vital Signs Last 24 Hrs  T(C): 36.7 (17 Sep 2019 09:42), Max: 36.7 (17 Sep 2019 09:42)  T(F): 98 (17 Sep 2019 09:42), Max: 98 (17 Sep 2019 09:42)  HR: 80 (17 Sep 2019 09:42) (80 - 80)  BP: 134/85 (17 Sep 2019 09:42) (134/85 - 134/85)  BP(mean): --  RR: 17 (17 Sep 2019 09:42) (17 - 17)  SpO2: 100% (17 Sep 2019 09:42) (100% - 100%)    LABS:                          17.2   15.31 )-----------( 339      ( 17 Sep 2019 12:10 )             52.1       09-17    137  |  100  |  18  ----------------------------<  141<H>  4.8   |  26  |  0.83    Ca    10.0      17 Sep 2019 12:10    TPro  7.9  /  Alb  4.1  /  TBili  0.3  /  DBili  x   /  AST  15  /  ALT  10  /  AlkPhos  101  09-17      CAPILLARY BLOOD GLUCOSE      POCT Blood Glucose.: 171 mg/dL (17 Sep 2019 09:45)      PT/INR - ( 17 Sep 2019 12:10 )   PT: 11.7 SEC;   INR: 1.05          PTT - ( 17 Sep 2019 12:10 )  PTT:37.4 SEC    LOWER EXTREMITY PHYSICAL EXAM:    Vascular: DP/PT 1/4 right; non palpable left, CFT <3 seconds B/L, Temperature gradient warm to warm left, warm to cold right  Neuro: Epicritic sensation present to the level of toes B/L  Wound #1:   Location: left foot lateral 5th met head  Size: 3 x 2 cm   Depth: down to subQ  Wound bed: necrotic/ischemic  Drainage: none  Odor: malodorous  Periwound: macerated  Etiology: friction    RADIOLOGY & ADDITIONAL STUDIES:

## 2019-09-19 NOTE — PROGRESS NOTE ADULT - PROBLEM SELECTOR PROBLEM 4
Essential hypertension Stented Coronary Artery Type 2 diabetes mellitus with diabetic neuropathy, with long-term current use of insulin

## 2019-09-19 NOTE — PROGRESS NOTE ADULT - PROBLEM SELECTOR PLAN 7
- DVT: Loxenox  - Diet: DASH/TLC consistent carbohydrates - DVT: Lovenox  - Diet: DASH/TLC consistent carbohydrates

## 2019-09-19 NOTE — CONSULT NOTE ADULT - CONSULT REASON
Pre-operative cardiovascular evaluation
nonhealing LLE 5th metatarsal wound
osteomyelitis
left foot wound

## 2019-09-19 NOTE — PROGRESS NOTE ADULT - ASSESSMENT
62 yo M pt w/ left foot lateral 5th met head wound  - pt seen and evaluated  - left foot lateral forefoot wound, ischemic changes noted to lateral border ucler with erythema improvement to periwound, no tracking, no malodor  - MRI results read demonstrating enhancing marrow signal abnormalities suspicious for OM of 5th met head and proximal phalanx  - AVELINA/PVR - Left AVELINA is moderately decreased (0.63). Left TBI isseverely decreased (0.33), with a toe pressure of 43 mmHg. Findings suggest the presence of multi-segment arterialdisease, including the femoropopliteal segment in the left lower extremity.  - 9/17 wound culture demonstrating no growth to date  - Pod plan pending vascular surgery team revasculariziation intervention  - seen w/ attending 64 yo M pt w/ left foot lateral 5th met head wound  - pt seen and evaluated  - left foot lateral forefoot wound, ischemic changes noted to lateral border ucler with erythema improvement to periwound, no tracking, no malodor  - MRI results read demonstrating enhancing marrow signal abnormalities suspicious for OM of 5th met head and proximal phalanx  - AVELINA/PVR - Left AVELINA is moderately decreased (0.63). Left TBI isseverely decreased (0.33), with a toe pressure of 43 mmHg. Findings suggest the presence of multi-segment arterialdisease, including the femoropopliteal segment in the left lower extremity.  - 9/17 wound culture demonstrating no growth to date  - Pod plan pending vascular surgery team revasculariziation intervention  - Discussed w/ attending

## 2019-09-19 NOTE — PROGRESS NOTE ADULT - PROBLEM SELECTOR PROBLEM 3
Type 2 diabetes mellitus with diabetic neuropathy, with long-term current use of insulin Essential hypertension Peripheral arterial disease

## 2019-09-19 NOTE — PROGRESS NOTE ADULT - PROBLEM SELECTOR PLAN 6
- DVT: Loxenox  - Diet: DASH/TLC consistent carbohydrates - S/p three stents  - C/w ASA, plavix, and statin

## 2019-09-20 LAB
-  AMIKACIN: SIGNIFICANT CHANGE UP
-  AMPICILLIN/SULBACTAM: SIGNIFICANT CHANGE UP
-  AMPICILLIN: SIGNIFICANT CHANGE UP
-  AZTREONAM: SIGNIFICANT CHANGE UP
-  CEFAZOLIN: SIGNIFICANT CHANGE UP
-  CEFAZOLIN: SIGNIFICANT CHANGE UP
-  CEFEPIME: SIGNIFICANT CHANGE UP
-  CEFOXITIN: SIGNIFICANT CHANGE UP
-  CEFTAZIDIME: SIGNIFICANT CHANGE UP
-  CEFTRIAXONE: SIGNIFICANT CHANGE UP
-  CIPROFLOXACIN: SIGNIFICANT CHANGE UP
-  CLINDAMYCIN: SIGNIFICANT CHANGE UP
-  ERTAPENEM: SIGNIFICANT CHANGE UP
-  ERYTHROMYCIN: SIGNIFICANT CHANGE UP
-  GENTAMICIN: SIGNIFICANT CHANGE UP
-  GENTAMICIN: SIGNIFICANT CHANGE UP
-  IMIPENEM: SIGNIFICANT CHANGE UP
-  LEVOFLOXACIN: SIGNIFICANT CHANGE UP
-  LEVOFLOXACIN: SIGNIFICANT CHANGE UP
-  MEROPENEM: SIGNIFICANT CHANGE UP
-  MOXIFLOXACIN(AEROBIC): SIGNIFICANT CHANGE UP
-  OXACILLIN: SIGNIFICANT CHANGE UP
-  PENICILLIN: SIGNIFICANT CHANGE UP
-  PIPERACILLIN/TAZOBACTAM: SIGNIFICANT CHANGE UP
-  RIFAMPIN.: SIGNIFICANT CHANGE UP
-  TETRACYCLINE: SIGNIFICANT CHANGE UP
-  TIGECYCLINE: SIGNIFICANT CHANGE UP
-  TOBRAMYCIN: SIGNIFICANT CHANGE UP
-  TRIMETHOPRIM/SULFAMETHOXAZOLE: SIGNIFICANT CHANGE UP
-  TRIMETHOPRIM/SULFAMETHOXAZOLE: SIGNIFICANT CHANGE UP
-  VANCOMYCIN: SIGNIFICANT CHANGE UP
ANION GAP SERPL CALC-SCNC: 17 MMO/L — HIGH (ref 7–14)
APTT BLD: 33.7 SEC — SIGNIFICANT CHANGE UP (ref 27.5–36.3)
BLD GP AB SCN SERPL QL: NEGATIVE — SIGNIFICANT CHANGE UP
BUN SERPL-MCNC: 16 MG/DL — SIGNIFICANT CHANGE UP (ref 7–23)
CALCIUM SERPL-MCNC: 9.7 MG/DL — SIGNIFICANT CHANGE UP (ref 8.4–10.5)
CHLORIDE SERPL-SCNC: 99 MMOL/L — SIGNIFICANT CHANGE UP (ref 98–107)
CO2 SERPL-SCNC: 22 MMOL/L — SIGNIFICANT CHANGE UP (ref 22–31)
CREAT SERPL-MCNC: 0.81 MG/DL — SIGNIFICANT CHANGE UP (ref 0.5–1.3)
CULTURE RESULTS: SIGNIFICANT CHANGE UP
GLUCOSE BLDC GLUCOMTR-MCNC: 135 MG/DL — HIGH (ref 70–99)
GLUCOSE BLDC GLUCOMTR-MCNC: 138 MG/DL — HIGH (ref 70–99)
GLUCOSE BLDC GLUCOMTR-MCNC: 166 MG/DL — HIGH (ref 70–99)
GLUCOSE BLDC GLUCOMTR-MCNC: 177 MG/DL — HIGH (ref 70–99)
GLUCOSE SERPL-MCNC: 166 MG/DL — HIGH (ref 70–99)
GRAM STN SPEC: SIGNIFICANT CHANGE UP
HCT VFR BLD CALC: 50.4 % — HIGH (ref 39–50)
HGB BLD-MCNC: 16.3 G/DL — SIGNIFICANT CHANGE UP (ref 13–17)
INR BLD: 1.12 — SIGNIFICANT CHANGE UP (ref 0.88–1.17)
MCHC RBC-ENTMCNC: 28 PG — SIGNIFICANT CHANGE UP (ref 27–34)
MCHC RBC-ENTMCNC: 32.3 % — SIGNIFICANT CHANGE UP (ref 32–36)
MCV RBC AUTO: 86.4 FL — SIGNIFICANT CHANGE UP (ref 80–100)
METHOD TYPE: SIGNIFICANT CHANGE UP
METHOD TYPE: SIGNIFICANT CHANGE UP
NRBC # FLD: 0 K/UL — SIGNIFICANT CHANGE UP (ref 0–0)
ORGANISM # SPEC MICROSCOPIC CNT: SIGNIFICANT CHANGE UP
PLATELET # BLD AUTO: 343 K/UL — SIGNIFICANT CHANGE UP (ref 150–400)
PMV BLD: 9.7 FL — SIGNIFICANT CHANGE UP (ref 7–13)
POTASSIUM SERPL-MCNC: 4 MMOL/L — SIGNIFICANT CHANGE UP (ref 3.5–5.3)
POTASSIUM SERPL-SCNC: 4 MMOL/L — SIGNIFICANT CHANGE UP (ref 3.5–5.3)
PROTHROM AB SERPL-ACNC: 12.5 SEC — SIGNIFICANT CHANGE UP (ref 9.8–13.1)
RBC # BLD: 5.83 M/UL — HIGH (ref 4.2–5.8)
RBC # FLD: 13.1 % — SIGNIFICANT CHANGE UP (ref 10.3–14.5)
RH IG SCN BLD-IMP: POSITIVE — SIGNIFICANT CHANGE UP
SODIUM SERPL-SCNC: 138 MMOL/L — SIGNIFICANT CHANGE UP (ref 135–145)
WBC # BLD: 14.24 K/UL — HIGH (ref 3.8–10.5)
WBC # FLD AUTO: 14.24 K/UL — HIGH (ref 3.8–10.5)

## 2019-09-20 PROCEDURE — 37224: CPT

## 2019-09-20 PROCEDURE — 99232 SBSQ HOSP IP/OBS MODERATE 35: CPT

## 2019-09-20 PROCEDURE — 99233 SBSQ HOSP IP/OBS HIGH 50: CPT | Mod: GC

## 2019-09-20 PROCEDURE — 37228: CPT

## 2019-09-20 RX ORDER — SODIUM CHLORIDE 9 MG/ML
500 INJECTION INTRAMUSCULAR; INTRAVENOUS; SUBCUTANEOUS
Refills: 0 | Status: DISCONTINUED | OUTPATIENT
Start: 2019-09-20 | End: 2019-09-22

## 2019-09-20 RX ORDER — VANCOMYCIN HCL 1 G
1250 VIAL (EA) INTRAVENOUS EVERY 12 HOURS
Refills: 0 | Status: DISCONTINUED | OUTPATIENT
Start: 2019-09-20 | End: 2019-09-20

## 2019-09-20 RX ADMIN — LISINOPRIL 5 MILLIGRAM(S): 2.5 TABLET ORAL at 05:30

## 2019-09-20 RX ADMIN — PIPERACILLIN AND TAZOBACTAM 25 GRAM(S): 4; .5 INJECTION, POWDER, LYOPHILIZED, FOR SOLUTION INTRAVENOUS at 22:00

## 2019-09-20 RX ADMIN — CLOPIDOGREL BISULFATE 75 MILLIGRAM(S): 75 TABLET, FILM COATED ORAL at 15:06

## 2019-09-20 RX ADMIN — Medication 1: at 09:55

## 2019-09-20 RX ADMIN — Medication 81 MILLIGRAM(S): at 15:05

## 2019-09-20 RX ADMIN — SIMVASTATIN 10 MILLIGRAM(S): 20 TABLET, FILM COATED ORAL at 21:55

## 2019-09-20 RX ADMIN — Medication 50 MILLIGRAM(S): at 05:30

## 2019-09-20 RX ADMIN — SODIUM CHLORIDE 75 MILLILITER(S): 9 INJECTION INTRAMUSCULAR; INTRAVENOUS; SUBCUTANEOUS at 16:13

## 2019-09-20 RX ADMIN — PIPERACILLIN AND TAZOBACTAM 25 GRAM(S): 4; .5 INJECTION, POWDER, LYOPHILIZED, FOR SOLUTION INTRAVENOUS at 16:06

## 2019-09-20 RX ADMIN — Medication 166.67 MILLIGRAM(S): at 05:30

## 2019-09-20 RX ADMIN — PIPERACILLIN AND TAZOBACTAM 25 GRAM(S): 4; .5 INJECTION, POWDER, LYOPHILIZED, FOR SOLUTION INTRAVENOUS at 07:21

## 2019-09-20 NOTE — PROGRESS NOTE ADULT - SUBJECTIVE AND OBJECTIVE BOX
VASCULAR SURGERY DAILY PROGRESS NOTE:       Subjective: Patient examined at bedside. No acute events overnight.       Objective:    Vital Signs Last 24 Hrs  T(C): 36.8 (20 Sep 2019 21:38), Max: 37 (20 Sep 2019 11:49)  T(F): 98.3 (20 Sep 2019 21:38), Max: 98.6 (20 Sep 2019 11:49)  HR: 105 (20 Sep 2019 21:38) (90 - 105)  BP: 105/58 (20 Sep 2019 21:38) (99/64 - 124/74)  BP(mean): --  RR: 18 (20 Sep 2019 21:38) (16 - 19)  SpO2: 95% (20 Sep 2019 21:38) (95% - 98%)    I&O's Detail      Labaratory Results:                          16.3   14.24 )-----------( 343      ( 20 Sep 2019 06:00 )             50.4     09-20    138  |  99  |  16  ----------------------------<  166<H>  4.0   |  22  |  0.81    Ca    9.7      20 Sep 2019 06:00    TPro  7.6  /  Alb  3.6  /  TBili  0.5  /  DBili  x   /  AST  16  /  ALT  11  /  AlkPhos  83  09-19    PT/INR - ( 20 Sep 2019 06:00 )   PT: 12.5 SEC;   INR: 1.12          PTT - ( 20 Sep 2019 06:00 )  PTT:33.7 SEC      Radiology and Additional Tests:    Medications:    MEDICATIONS  (STANDING):  aspirin enteric coated 81 milliGRAM(s) Oral daily  clopidogrel Tablet 75 milliGRAM(s) Oral daily  collagenase Ointment 1 Application(s) Topical daily  dextrose 5%. 1000 milliLiter(s) (50 mL/Hr) IV Continuous <Continuous>  dextrose 50% Injectable 12.5 Gram(s) IV Push once  dextrose 50% Injectable 25 Gram(s) IV Push once  dextrose 50% Injectable 25 Gram(s) IV Push once  enoxaparin Injectable 40 milliGRAM(s) SubCutaneous daily  escitalopram 20 milliGRAM(s) Oral daily  influenza   Vaccine 0.5 milliLiter(s) IntraMuscular once  insulin lispro (HumaLOG) corrective regimen sliding scale   SubCutaneous three times a day before meals  insulin lispro (HumaLOG) corrective regimen sliding scale   SubCutaneous at bedtime  lidocaine 1% Injectable 20 milliLiter(s) Local Injection Once  lisinopril 5 milliGRAM(s) Oral daily  metoprolol succinate ER 50 milliGRAM(s) Oral daily  piperacillin/tazobactam IVPB.. 3.375 Gram(s) IV Intermittent every 8 hours  simvastatin 10 milliGRAM(s) Oral at bedtime  sodium chloride 0.9%. 500 milliLiter(s) (75 mL/Hr) IV Continuous <Continuous>    MEDICATIONS  (PRN):  dextrose 40% Gel 15 Gram(s) Oral once PRN Blood Glucose LESS THAN 70 milliGRAM(s)/deciliter  glucagon  Injectable 1 milliGRAM(s) IntraMuscular once PRN Glucose LESS THAN 70 milligrams/deciliter  nicotine - Inhaler 1 Each Inhalation every 6 hours PRN nicotine withdrawal      Physical Exam    General: NAD, Lying in bed comfortably  Neuro: AAO  HEENT: NC/AT, EOMI  Resp: Good effort, No distress  GI/Abd: Soft, NTND, no rebound/guarding, no masses palpated  Vascular: B/l radial pulses palpable, Dopplerable DP/PT bilaterally. ~3cm lesion to lateral aspect of foot with central tracking of erythema onto dorsal surface. Outlined in pen from someone else's exam. Erythema does not extend beyond this border.  Skin: Intact, no breakdown  Musculoskeletal: All 4 extremities moving spontaneously, no limitations

## 2019-09-20 NOTE — PROGRESS NOTE ADULT - ASSESSMENT
63M with PAD and nonhealing left 5th metatarsal wound.    - MRI results noted - findings suspicious for OM  - Plan for angio of LLE today    NENO Veronica, PGY2  Vascular Surgery (C Team Surgery)  [phone] with any questions

## 2019-09-20 NOTE — PROGRESS NOTE ADULT - ASSESSMENT
63 year old male with history of HTN, T2DM (on insulin), CAD s/p stents was sent in by podiatrist for a left foot ulcer, admitted for further management. Poorly healing LLE ulcer in the setting of LLE arterial disease, to undergo angiography. MRI suggestive of LORI, ID following, on Vancomycin, Zosyn

## 2019-09-20 NOTE — CHART NOTE - NSCHARTNOTEFT_GEN_A_CORE
POST-OPERATIVE NOTE    Patient is s/p angiogram with femoral cutdown. Recovering appropriately.     Subjective: Patient states he feels much better. That he feels "the same he has felt the last 3 times he got stents placed"     Vital Signs Last 24 Hrs  T(C): 37 (20 Sep 2019 18:45), Max: 37 (20 Sep 2019 11:49)  T(F): 98.6 (20 Sep 2019 18:45), Max: 98.6 (20 Sep 2019 11:49)  HR: 90 (20 Sep 2019 18:45) (90 - 100)  BP: 118/71 (20 Sep 2019 18:45) (99/64 - 124/74)  BP(mean): --  RR: 19 (20 Sep 2019 18:45) (16 - 19)  SpO2: 95% (20 Sep 2019 18:45) (95% - 98%)  I&O's Detail    piperacillin/tazobactam IVPB.. 3.375  aspirin enteric coated 81  clopidogrel Tablet 75  enoxaparin Injectable 40  lisinopril 5  metoprolol succinate ER 50  piperacillin/tazobactam IVPB.. 3.375    PAST MEDICAL & SURGICAL HISTORY:  Stented Coronary Artery  COPD (Chronic Obstructive Pulmonary Disease)  Old myocardial infarction  Diabetes Mellitus Type II  Hypertension  H/O: Varicose Veins: laser surgery  Knee Arthroplasty  Ankle Fracture: left repaired        Physical Exam:  General: NAD, resting comfortably in bed  Pulmonary: Nonlabored breathing, no respiratory distress  Cardiovascular: NSR  Abdominal: soft, NT/ND  Extremities: WWP. Left extremity with palpable pulses in popliteal artery and foot. Sore on plantar surface of foot (care under podiatry).      LABS:                        16.3   14.24 )-----------( 343      ( 20 Sep 2019 06:00 )             50.4     09-20    138  |  99  |  16  ----------------------------<  166<H>  4.0   |  22  |  0.81    Ca    9.7      20 Sep 2019 06:00    TPro  7.6  /  Alb  3.6  /  TBili  0.5  /  DBili  x   /  AST  16  /  ALT  11  /  AlkPhos  83  09-19    PT/INR - ( 20 Sep 2019 06:00 )   PT: 12.5 SEC;   INR: 1.12          PTT - ( 20 Sep 2019 06:00 )  PTT:33.7 SEC  CAPILLARY BLOOD GLUCOSE      POCT Blood Glucose.: 138 mg/dL (20 Sep 2019 18:10)  POCT Blood Glucose: 135 mg/dL (20 Sep 2019 15:30)  POCT Blood Glucose.: 166 mg/dL (20 Sep 2019 09:52)  POCT Blood Glucose.: 177 mg/dL (20 Sep 2019 08:44)  POCT Blood Glucose.: 211 mg/dL (19 Sep 2019 22:12)      Radiology and Additional Studies:  < from: MR Foot w/wo IV Cont, Left (09.18.19 @ 17:29) >  Lateral forefoot soft tissue ulceration over 5th MTP region with   underlying enhancing marrow signal abnormalities suspicious for   osteomyelitis involving 5th metatarsal head and proximal phalanx. No   discrete drainable abscess collections.  Nonspecific plantar myopathy.  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  < end of copied text >    Assessment:  The patient is a 63y Male who is now several hours post-op from a left sided angiogram with femoral cutdown.    Plan:  - Diet: Regular   - Pain control as needed  - DVT ppx  - OOB and ambulating as tolerated  - F/u AM labs POST-OPERATIVE NOTE    Patient is s/p angiogram with angioplasty. Recovering appropriately.     Subjective: Patient states he feels much better. That he feels "the same he has felt the last 3 times he got stents placed"     Vital Signs Last 24 Hrs  T(C): 37 (20 Sep 2019 18:45), Max: 37 (20 Sep 2019 11:49)  T(F): 98.6 (20 Sep 2019 18:45), Max: 98.6 (20 Sep 2019 11:49)  HR: 90 (20 Sep 2019 18:45) (90 - 100)  BP: 118/71 (20 Sep 2019 18:45) (99/64 - 124/74)  BP(mean): --  RR: 19 (20 Sep 2019 18:45) (16 - 19)  SpO2: 95% (20 Sep 2019 18:45) (95% - 98%)  I&O's Detail    piperacillin/tazobactam IVPB.. 3.375  aspirin enteric coated 81  clopidogrel Tablet 75  enoxaparin Injectable 40  lisinopril 5  metoprolol succinate ER 50  piperacillin/tazobactam IVPB.. 3.375    PAST MEDICAL & SURGICAL HISTORY:  Stented Coronary Artery  COPD (Chronic Obstructive Pulmonary Disease)  Old myocardial infarction  Diabetes Mellitus Type II  Hypertension  H/O: Varicose Veins: laser surgery  Knee Arthroplasty  Ankle Fracture: left repaired        Physical Exam:  General: NAD, resting comfortably in bed  Pulmonary: Nonlabored breathing, no respiratory distress  Cardiovascular: NSR  Abdominal: soft, NT/ND  Extremities: WWP. Left extremity with palpable pulses in popliteal artery and foot. Sore on plantar surface of foot (care under podiatry).      LABS:                        16.3   14.24 )-----------( 343      ( 20 Sep 2019 06:00 )             50.4     09-20    138  |  99  |  16  ----------------------------<  166<H>  4.0   |  22  |  0.81    Ca    9.7      20 Sep 2019 06:00    TPro  7.6  /  Alb  3.6  /  TBili  0.5  /  DBili  x   /  AST  16  /  ALT  11  /  AlkPhos  83  09-19    PT/INR - ( 20 Sep 2019 06:00 )   PT: 12.5 SEC;   INR: 1.12          PTT - ( 20 Sep 2019 06:00 )  PTT:33.7 SEC  CAPILLARY BLOOD GLUCOSE      POCT Blood Glucose.: 138 mg/dL (20 Sep 2019 18:10)  POCT Blood Glucose: 135 mg/dL (20 Sep 2019 15:30)  POCT Blood Glucose.: 166 mg/dL (20 Sep 2019 09:52)  POCT Blood Glucose.: 177 mg/dL (20 Sep 2019 08:44)  POCT Blood Glucose.: 211 mg/dL (19 Sep 2019 22:12)      Radiology and Additional Studies:  < from: MR Foot w/wo IV Cont, Left (09.18.19 @ 17:29) >  Lateral forefoot soft tissue ulceration over 5th MTP region with   underlying enhancing marrow signal abnormalities suspicious for   osteomyelitis involving 5th metatarsal head and proximal phalanx. No   discrete drainable abscess collections.  Nonspecific plantar myopathy.  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  < end of copied text >    Assessment:  The patient is a 63y Male who is now several hours post-op from a left sided angiogram with angioplasty    Plan:  - Diet: Regular   - Pain control as needed  - DVT ppx  - OOB and ambulating as tolerated  - F/u AM labs

## 2019-09-20 NOTE — PROGRESS NOTE ADULT - PROBLEM SELECTOR PLAN 3
- Venous duplex - L AVELINA decreased at 0.63, L TBI decreased at 0.33 - indicative of arterial disease, likely  - Vascular surgery to perform angiogram, tentative tomorrow - Venous duplex - L AVELINA decreased at 0.63, L TBI decreased at 0.33 - indicative of arterial disease, likely  - Vascular surgery to perform angiogram today

## 2019-09-20 NOTE — PROGRESS NOTE ADULT - ASSESSMENT
64 yo M pt w/ left foot lateral 5th met head wound  - pt seen and evaluated  - left foot lateral forefoot wound, ischemic changes noted to lateral border ucler with erythema improvement to periwound, no tracking, no malodor  - MRI results read demonstrating enhancing marrow signal abnormalities suspicious for OM of 5th met head and proximal phalanx  - AVELINA/PVR - Left AVELINA is moderately decreased (0.63). Left TBI isseverely decreased (0.33), with a toe pressure of 43 mmHg. Findings suggest the presence of multi-segment arterialdisease, including the femoropopliteal segment in the left lower extremity.  - Prelim WCx growing CNS, Klebsiella Pneumoniae, STAU.  - Pod plan pending vascular surgery team revascularization intervention (likely 9/20)  - Discussed w/ attending 62 yo M pt w/ left foot lateral 5th met head wound  - pt seen and evaluated  - left foot lateral forefoot wound, ischemic changes noted to lateral border ucler with erythema improvement to periwound, no tracking, no malodor  - MRI results read demonstrating enhancing marrow signal abnormalities suspicious for OM of 5th met head and proximal phalanx  - AVELINA/PVR - Left AVELINA is moderately decreased (0.63). Left TBI isseverely decreased (0.33), with a toe pressure of 43 mmHg. Findings suggest the presence of multi-segment arterialdisease, including the femoropopliteal segment in the left lower extremity.  - Prelim WCx growing CNS, Klebsiella Pneumoniae, MSSA.  - Pod plan is to d/c w/ PICC and local wound care while in house   - Discussed w/ attending

## 2019-09-20 NOTE — PROGRESS NOTE ADULT - SUBJECTIVE AND OBJECTIVE BOX
Patient is a 63y old  Male who presents with a chief complaint of Left foot ulcer (20 Sep 2019 08:15)      SUBJECTIVE / OVERNIGHT EVENTS: No overnight events. Patient had a CODE GREY yesterday because he was leaving the floor. Patient slightly frustrated with angiogram being rescheduled for today. Otherwise no complaints. Reports LLE cellulitis improving. Able to partially bear weight.     MEDICATIONS  (STANDING):  aspirin enteric coated 81 milliGRAM(s) Oral daily  clopidogrel Tablet 75 milliGRAM(s) Oral daily  collagenase Ointment 1 Application(s) Topical daily  dextrose 5%. 1000 milliLiter(s) (50 mL/Hr) IV Continuous <Continuous>  dextrose 50% Injectable 12.5 Gram(s) IV Push once  dextrose 50% Injectable 25 Gram(s) IV Push once  dextrose 50% Injectable 25 Gram(s) IV Push once  enoxaparin Injectable 40 milliGRAM(s) SubCutaneous daily  escitalopram 20 milliGRAM(s) Oral daily  influenza   Vaccine 0.5 milliLiter(s) IntraMuscular once  insulin lispro (HumaLOG) corrective regimen sliding scale   SubCutaneous three times a day before meals  insulin lispro (HumaLOG) corrective regimen sliding scale   SubCutaneous at bedtime  lidocaine 1% Injectable 20 milliLiter(s) Local Injection Once  lisinopril 5 milliGRAM(s) Oral daily  metoprolol succinate ER 50 milliGRAM(s) Oral daily  piperacillin/tazobactam IVPB.. 3.375 Gram(s) IV Intermittent every 8 hours  simvastatin 10 milliGRAM(s) Oral at bedtime  vancomycin  IVPB 1250 milliGRAM(s) IV Intermittent every 12 hours    MEDICATIONS  (PRN):  dextrose 40% Gel 15 Gram(s) Oral once PRN Blood Glucose LESS THAN 70 milliGRAM(s)/deciliter  glucagon  Injectable 1 milliGRAM(s) IntraMuscular once PRN Glucose LESS THAN 70 milligrams/deciliter  nicotine - Inhaler 1 Each Inhalation every 6 hours PRN nicotine withdrawal      T(C): 36.6 (09-20-19 @ 05:22), Max: 36.7 (09-19-19 @ 13:58)  HR: 93 (09-20-19 @ 05:22) (86 - 100)  BP: 124/74 (09-20-19 @ 05:22) (118/63 - 124/74)  RR: 16 (09-20-19 @ 05:22) (16 - 18)  SpO2: 98% (09-20-19 @ 05:22) (95% - 98%)    PHYSICAL EXAM  GENERAL: NAD, well-developed  NEURO: AO x3  HEAD:  Atraumatic, Normocephalic  EYES: conjunctiva and sclera clear  NECK: Supple  CHEST/LUNG: Clear to auscultation bilaterally; No wheezes, rales or rhonchi  HEART: Regular rate and rhythm; No murmurs, rubs, or gallops  ABDOMEN: Soft, Nontender, Nondistended; Bowel sounds present, no masses.  EXTREMITIES:  2+ Peripheral Pulses, No clubbing, cyanosis, or edema  SKIN: LLE bandage CDI, mild erythema present, no TTP, no drainage noted     LABS:                        16.3   14.24 )-----------( 343      ( 20 Sep 2019 06:00 )             50.4     09-20    138  |  99  |  16  ----------------------------<  166<H>  4.0   |  22  |  0.81    Ca    9.7      20 Sep 2019 06:00    TPro  7.6  /  Alb  3.6  /  TBili  0.5  /  DBili  x   /  AST  16  /  ALT  11  /  AlkPhos  83  09-19    PT/INR - ( 20 Sep 2019 06:00 )   PT: 12.5 SEC;   INR: 1.12          PTT - ( 20 Sep 2019 06:00 )  PTT:33.7 SEC        I&O's Summary

## 2019-09-20 NOTE — PROGRESS NOTE ADULT - PROBLEM SELECTOR PLAN 1
- MRI 9/18 suggestive of OM  - ID consulted, to continue vancomycin and zosyn  - F/u wound cx - Klebsiella, Staph aures, staph coag neg

## 2019-09-20 NOTE — PROGRESS NOTE ADULT - SUBJECTIVE AND OBJECTIVE BOX
Patient is a 63y old  Male who presents with a chief complaint of Left foot ulcer (19 Sep 2019 10:04)    f/u foot infection    interval history/ROS:    PAST MEDICAL & SURGICAL HISTORY:  Stented Coronary Artery  COPD (Chronic Obstructive Pulmonary Disease)  Old myocardial infarction  Diabetes Mellitus Type II  Hypertension  H/O: Varicose Veins: laser surgery  Knee Arthroplasty  Ankle Fracture: left repaired    Allergies  No Known Allergies    ANTIMICROBIALS:  piperacillin/tazobactam IVPB.. 3.375 every 8 hours (9/17-)  vancomycin  IVPB 1250 every 12 hours (9/17-)    MEDICATIONS  (STANDING):  aspirin enteric coated 81 daily  clopidogrel Tablet 75 daily  enoxaparin Injectable 40 daily  escitalopram 20 daily  influenza   Vaccine 0.5 once  insulin lispro (HumaLOG) corrective regimen sliding scale  three times a day before meals  insulin lispro (HumaLOG) corrective regimen sliding scale  at bedtime  lisinopril 5 daily  metoprolol succinate ER 50 daily  simvastatin 10 at bedtime    Vital Signs Last 24 Hrs  T(F): 98.6 (09-20-19 @ 11:49), Max: 98.6 (09-20-19 @ 11:49)  HR: 93 (09-20-19 @ 05:22)  BP: 99/64 (09-20-19 @ 11:49)  RR: 18 (09-20-19 @ 11:49)  SpO2: 98% (09-20-19 @ 11:49) (95% - 98%)    PHYSICAL EXAM:      Hemoglobin A1C, Whole Blood: 9.3:  % (09.18.19 @ 05:39)    Sedimentation Rate, Erythrocyte: 51 (09-17-19)  C-Reactive Protein, Serum: 28.6 (09-17-19)                           16.3   14.24 )-----------( 343      ( 20 Sep 2019 06:00 )             50.4 09-20    138  |  99  |  16  ----------------------------<  166  4.0   |  22  |  0.81  Ca    9.7      20 Sep 2019 06:00  TPro  7.6  /  Alb  3.6  /  TBili  0.5  /  DBili  x   /  AST  16  /  ALT  11  /  AlkPhos  83  09-19    MICROBIOLOGY:  Vancomycin Level, Trough: 7.8 (09-19 @ 04:40)    Culture - Abscess with Gram Stain (collected 17 Sep 2019 16:37)  Source: OTHER  Preliminary Report (19 Sep 2019 12:40):    MODERATE    KLP^Klebsiella pneumoniae (only amp-R)    STAU^Staphylococcus aureus (MSSA)    STCN^Staphylococcus sp.,coag neg    RADIOLOGY:  imaging below personally reviewed    MR Foot w/wo IV Cont, Left (09.18.19 @ 17:29)   IMPRESSION:  Lateral forefoot soft tissue ulceration over 5th MTP region with underlying enhancing marrow signal abnormalities suspicious for osteomyelitis involving 5th metatarsal head and proximal phalanx. No discrete drainable abscess collections.  Nonspecific plantar myopathy.  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 Patient is a 63y old  Male who presents with a chief complaint of Left foot ulcer (19 Sep 2019 10:04)    f/u foot infection    interval history/ROS: feels okay.  no fever.  foot ok.  s/p angiogram. no n/v/d.  Remainder of ROS otherwise negative.    PAST MEDICAL & SURGICAL HISTORY:  Stented Coronary Artery  COPD (Chronic Obstructive Pulmonary Disease)  Old myocardial infarction  Diabetes Mellitus Type II  Hypertension  H/O: Varicose Veins: laser surgery  Knee Arthroplasty  Ankle Fracture: left repaired    Allergies  No Known Allergies    ANTIMICROBIALS:  piperacillin/tazobactam IVPB.. 3.375 every 8 hours (9/17-)  vancomycin  IVPB 1250 every 12 hours (9/17-)    MEDICATIONS  (STANDING):  aspirin enteric coated 81 daily  clopidogrel Tablet 75 daily  enoxaparin Injectable 40 daily  escitalopram 20 daily  influenza   Vaccine 0.5 once  insulin lispro (HumaLOG) corrective regimen sliding scale  three times a day before meals  insulin lispro (HumaLOG) corrective regimen sliding scale  at bedtime  lisinopril 5 daily  metoprolol succinate ER 50 daily  simvastatin 10 at bedtime    Vital Signs Last 24 Hrs  T(F): 98.6 (09-20-19 @ 11:49), Max: 98.6 (09-20-19 @ 11:49)  HR: 93 (09-20-19 @ 05:22)  BP: 99/64 (09-20-19 @ 11:49)  RR: 18 (09-20-19 @ 11:49)  SpO2: 98% (09-20-19 @ 11:49) (95% - 98%)    PHYSICAL EXAM:  General: non-toxic, seen in recovery  HEAD/EYES: anicteric  ENT:  supple  Cardiovascular:   S1, S2  Respiratory:  clear bilaterally  GI:  soft, non-tender, normal bowel sounds  :  no mckeon  Musculoskeletal:  no synovitis  Neurologic:  decreased sensation b/l feet  Skin:  did not look at wound today  Psychiatric:  appropriate affect  Vascular:  no phlebitis    Hemoglobin A1C, Whole Blood: 9.3:  % (09.18.19 @ 05:39)    Sedimentation Rate, Erythrocyte: 51 (09-17-19)  C-Reactive Protein, Serum: 28.6 (09-17-19)                           16.3   14.24 )-----------( 343      ( 20 Sep 2019 06:00 )             50.4 09-20    138  |  99  |  16  ----------------------------<  166  4.0   |  22  |  0.81  Ca    9.7      20 Sep 2019 06:00  TPro  7.6  /  Alb  3.6  /  TBili  0.5  /  DBili  x   /  AST  16  /  ALT  11  /  AlkPhos  83  09-19    MICROBIOLOGY:  Vancomycin Level, Trough: 7.8 (09-19 @ 04:40)    Culture - Abscess with Gram Stain (collected 17 Sep 2019 16:37)  Source: OTHER  Preliminary Report (19 Sep 2019 12:40):    MODERATE    KLP^Klebsiella pneumoniae (only amp-R)    STAU^Staphylococcus aureus (MSSA)    STCN^Staphylococcus sp.,coag neg    RADIOLOGY:  imaging below personally reviewed    MR Foot w/wo IV Cont, Left (09.18.19 @ 17:29)   IMPRESSION:  Lateral forefoot soft tissue ulceration over 5th MTP region with underlying enhancing marrow signal abnormalities suspicious for osteomyelitis involving 5th metatarsal head and proximal phalanx. No discrete drainable abscess collections.  Nonspecific plantar myopathy.  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

## 2019-09-20 NOTE — PROGRESS NOTE ADULT - PROBLEM SELECTOR PLAN 4
- History of diabetes, uses insulin at home  - HgbA1c 9.3  - -150  - C/w ISS  - Monitor glucose levels

## 2019-09-20 NOTE — PROGRESS NOTE ADULT - SUBJECTIVE AND OBJECTIVE BOX
Patient is a 63y old  Male who presents with a chief complaint of Left foot ulcer (19 Sep 2019 15:06)       INTERVAL HPI/OVERNIGHT EVENTS:  Patient seen and evaluated at bedside.  Pt is resting comfortable in NAD. Denies N/V/F/C.      Allergies    No Known Allergies    Intolerances        Vital Signs Last 24 Hrs  T(C): 36.6 (20 Sep 2019 05:22), Max: 36.7 (19 Sep 2019 13:58)  T(F): 97.8 (20 Sep 2019 05:22), Max: 98.1 (19 Sep 2019 21:26)  HR: 93 (20 Sep 2019 05:22) (86 - 100)  BP: 124/74 (20 Sep 2019 05:22) (118/63 - 124/74)  BP(mean): --  RR: 16 (20 Sep 2019 05:22) (16 - 18)  SpO2: 98% (20 Sep 2019 05:22) (95% - 98%)    LABS:                        16.3   14.24 )-----------( 343      ( 20 Sep 2019 06:00 )             50.4     09-20    138  |  99  |  16  ----------------------------<  166<H>  4.0   |  22  |  0.81    Ca    9.7      20 Sep 2019 06:00    TPro  7.6  /  Alb  3.6  /  TBili  0.5  /  DBili  x   /  AST  16  /  ALT  11  /  AlkPhos  83  09-19    PT/INR - ( 20 Sep 2019 06:00 )   PT: 12.5 SEC;   INR: 1.12          PTT - ( 20 Sep 2019 06:00 )  PTT:33.7 SEC    CAPILLARY BLOOD GLUCOSE      POCT Blood Glucose.: 211 mg/dL (19 Sep 2019 22:12)  POCT Blood Glucose.: 251 mg/dL (19 Sep 2019 18:20)  POCT Blood Glucose.: 104 mg/dL (19 Sep 2019 11:56)      Lower Extremity Physical Exam:  Vascular: DP/PT 1/4 right; non palpable left, CFT <3 seconds B/L, Temperature gradient warm to warm left, warm to cold right  Neuro: Epicritic sensation present to the level of toes B/L  Wound #1:   Location: left foot lateral 5th met head  Size: 3 x 2 cm   Depth: down to subQ  Wound bed: necrotic/ischemic  Drainage: none  Odor: malodorous  Periwound: macerated  Etiology: friction    RADIOLOGY & ADDITIONAL TESTS:

## 2019-09-20 NOTE — PROGRESS NOTE ADULT - PROBLEM SELECTOR PLAN 2
- Ulcer on LLE x 3 weeks, poorly healing ulcer  - Venous duplex - L AVELINA decreased at 0.63, L TBI decreased at 0.33 - indicative of arterial disease  - To undergo angiography of LLE per vascular surgery  - F/u wound cx  - ID consulted, c/w vancomycin and zosyn  - Podiatry following

## 2019-09-20 NOTE — PROGRESS NOTE ADULT - ASSESSMENT
63M with CAD, HTN, long-standing diabetes that is poorly controlled (A1C=9.8) here with foot ulcer c/b infection and osteomyelitis.  Superficial culture is polymicrobial with kpna, MSSA, CoNS.  for angiogram today.    Suggest: 63M with CAD, HTN, long-standing diabetes that is poorly controlled (A1C=9.8) here with foot ulcer c/b infection and osteomyelitis.  Superficial culture is polymicrobial with kpna, MSSA, CoNS.  for angiogram today.    Suggest:  - can stop vancomycin (mssa)  - zosyn okay for now  - await podiatry plan    Please call the ID service [phone] with questions or concerns over the weekend

## 2019-09-21 LAB
GLUCOSE BLDC GLUCOMTR-MCNC: 194 MG/DL — HIGH (ref 70–99)
GLUCOSE BLDC GLUCOMTR-MCNC: 211 MG/DL — HIGH (ref 70–99)
GLUCOSE BLDC GLUCOMTR-MCNC: 213 MG/DL — HIGH (ref 70–99)

## 2019-09-21 PROCEDURE — 99232 SBSQ HOSP IP/OBS MODERATE 35: CPT

## 2019-09-21 PROCEDURE — 99233 SBSQ HOSP IP/OBS HIGH 50: CPT | Mod: GC

## 2019-09-21 RX ADMIN — CLOPIDOGREL BISULFATE 75 MILLIGRAM(S): 75 TABLET, FILM COATED ORAL at 12:34

## 2019-09-21 RX ADMIN — PIPERACILLIN AND TAZOBACTAM 25 GRAM(S): 4; .5 INJECTION, POWDER, LYOPHILIZED, FOR SOLUTION INTRAVENOUS at 22:06

## 2019-09-21 RX ADMIN — ESCITALOPRAM OXALATE 20 MILLIGRAM(S): 10 TABLET, FILM COATED ORAL at 12:33

## 2019-09-21 RX ADMIN — PIPERACILLIN AND TAZOBACTAM 25 GRAM(S): 4; .5 INJECTION, POWDER, LYOPHILIZED, FOR SOLUTION INTRAVENOUS at 13:44

## 2019-09-21 RX ADMIN — Medication 1 APPLICATION(S): at 12:37

## 2019-09-21 RX ADMIN — Medication 3: at 13:45

## 2019-09-21 RX ADMIN — PIPERACILLIN AND TAZOBACTAM 25 GRAM(S): 4; .5 INJECTION, POWDER, LYOPHILIZED, FOR SOLUTION INTRAVENOUS at 06:23

## 2019-09-21 RX ADMIN — Medication 1: at 09:18

## 2019-09-21 RX ADMIN — SIMVASTATIN 10 MILLIGRAM(S): 20 TABLET, FILM COATED ORAL at 22:06

## 2019-09-21 RX ADMIN — Medication 81 MILLIGRAM(S): at 12:33

## 2019-09-21 RX ADMIN — ENOXAPARIN SODIUM 40 MILLIGRAM(S): 100 INJECTION SUBCUTANEOUS at 12:34

## 2019-09-21 RX ADMIN — Medication 2: at 18:35

## 2019-09-21 NOTE — PROGRESS NOTE ADULT - PROBLEM SELECTOR PLAN 4
- History of diabetes, uses insulin at home  - HgbA1c 9.3  - -150  - C/w ISS  - Monitor glucose levels - History of diabetes, uses insulin at home  - HgbA1c 9.3  - -170  - C/w ISS  - Monitor glucose levels

## 2019-09-21 NOTE — PROGRESS NOTE ADULT - PROBLEM SELECTOR PLAN 2
- Ulcer on LLE x 3 weeks, poorly healing ulcer  - Venous duplex - L AVELINA decreased at 0.63, L TBI decreased at 0.33 - indicative of arterial disease  - To undergo angiography of LLE per vascular surgery  - F/u wound cx  - ID consulted, c/w vancomycin and zosyn  - Podiatry following - Ulcer on LLE x 3 weeks, poorly healing ulcer  - Venous duplex - L AVELINA decreased at 0.63, L TBI decreased at 0.33 - indicative of arterial disease  - To undergo angiography of LLE per vascular surgery  - Wound Cx - Klebsiella, S. aureus, Coag negative Staph  - C/w zosyn, ID following  - Podiatry following - Ulcer on LLE x 3 weeks, poorly healing ulcer  - Venous duplex - L AVELINA decreased at 0.63, L TBI decreased at 0.33 - indicative of arterial disease  - To undergo angiography of LLE per vascular surgery  - Wound Cx - Klebsiella, S. aureus, Coag negative Staph  - C/w zosyn, ID following  - Podiatry following, f/u recs - Ulcer on LLE x 3 weeks, poorly healing ulcer  - Venous duplex - L AVELINA decreased at 0.63, L TBI decreased at 0.33 - indicative of arterial disease  - S/p angiogram with angioplasty  - Wound Cx - Klebsiella, S. aureus, Coag negative Staph  - Podiatry following, no plan for surgical intervention  - C/w zosyn, ID following  - Plan for PICC placement for outpatient IV course

## 2019-09-21 NOTE — PROGRESS NOTE ADULT - ASSESSMENT
Status post LLE angioplasty POD1  No cardiac complaints -- remains hemodynamically stable  No cardiac testing needed at this time  Patient can f/u with me for routine cardiovascular follow-up as scheduled.

## 2019-09-21 NOTE — PROGRESS NOTE ADULT - PROBLEM SELECTOR PLAN 1
- MRI 9/18 suggestive of OM  - ID consulted, to continue vancomycin and zosyn  - F/u wound cx - Klebsiella, Staph aures, staph coag neg - MRI 9/18 suggestive of OM  - Wound Cx significant for Klebsiella, S. aureus, Coag negative Staph  - C/w zosyn, ID following - MRI 9/18 suggestive of OM  - Wound Cx significant for Klebsiella, S. aureus, Coag negative Staph  - C/w zosyn, ID following  - Plan for PICC placement for outpatient IV course

## 2019-09-21 NOTE — PROGRESS NOTE ADULT - SUBJECTIVE AND OBJECTIVE BOX
VASCULAR SURGERY DAILY PROGRESS NOTE:       Subjective: Patient examined at bedside. No acute events overnight. Tolerated procedure well yesterday.      Objective:  Vital Signs Last 24 Hrs  T(C): 36.9 (21 Sep 2019 06:20), Max: 37 (20 Sep 2019 11:49)  T(F): 98.4 (21 Sep 2019 06:20), Max: 98.6 (20 Sep 2019 11:49)  HR: 97 (21 Sep 2019 06:20) (90 - 105)  BP: 108/67 (21 Sep 2019 06:20) (99/64 - 118/71)  BP(mean): --  RR: 17 (21 Sep 2019 06:20) (17 - 19)  SpO2: 97% (21 Sep 2019 06:20) (95% - 98%)    I&O's Detail      MEDICATIONS  (STANDING):  aspirin enteric coated 81 milliGRAM(s) Oral daily  clopidogrel Tablet 75 milliGRAM(s) Oral daily  collagenase Ointment 1 Application(s) Topical daily  dextrose 5%. 1000 milliLiter(s) (50 mL/Hr) IV Continuous <Continuous>  dextrose 50% Injectable 12.5 Gram(s) IV Push once  dextrose 50% Injectable 25 Gram(s) IV Push once  dextrose 50% Injectable 25 Gram(s) IV Push once  enoxaparin Injectable 40 milliGRAM(s) SubCutaneous daily  escitalopram 20 milliGRAM(s) Oral daily  influenza   Vaccine 0.5 milliLiter(s) IntraMuscular once  insulin lispro (HumaLOG) corrective regimen sliding scale   SubCutaneous three times a day before meals  insulin lispro (HumaLOG) corrective regimen sliding scale   SubCutaneous at bedtime  lidocaine 1% Injectable 20 milliLiter(s) Local Injection Once  lisinopril 5 milliGRAM(s) Oral daily  metoprolol succinate ER 50 milliGRAM(s) Oral daily  piperacillin/tazobactam IVPB.. 3.375 Gram(s) IV Intermittent every 8 hours  simvastatin 10 milliGRAM(s) Oral at bedtime  sodium chloride 0.9%. 500 milliLiter(s) (75 mL/Hr) IV Continuous <Continuous>    MEDICATIONS  (PRN):  dextrose 40% Gel 15 Gram(s) Oral once PRN Blood Glucose LESS THAN 70 milliGRAM(s)/deciliter  glucagon  Injectable 1 milliGRAM(s) IntraMuscular once PRN Glucose LESS THAN 70 milligrams/deciliter  nicotine - Inhaler 1 Each Inhalation every 6 hours PRN nicotine withdrawal                            16.3   14.24 )-----------( 343      ( 20 Sep 2019 06:00 )             50.4       09-20    138  |  99  |  16  ----------------------------<  166<H>  4.0   |  22  |  0.81    Ca    9.7      20 Sep 2019 06:00          Physical Exam    General: NAD, Lying in bed comfortably  Neuro: AAO  HEENT: NC/AT, EOMI  Resp: Good effort, No distress  GI/Abd: Soft, NTND, no rebound/guarding, no masses palpated  Vascular: Groin dressing c/d/i, no palpable collections, LLE: palpable AT, dressing c/d/i  Skin: Intact, no breakdown  Musculoskeletal: All 4 extremities moving spontaneously, no limitations

## 2019-09-21 NOTE — PROGRESS NOTE ADULT - ASSESSMENT
63M with PAD and nonhealing left 5th metatarsal wound POD1 LLE angiogram with angioplasty     - Patient with palpable AT pulse  - MRI results noted - findings suspicious for OM  - Rest of care per primary team      NENO Veronica, PGY2  Vascular Surgery (C Team Surgery)  [phone] with any questions

## 2019-09-21 NOTE — PROGRESS NOTE ADULT - SUBJECTIVE AND OBJECTIVE BOX
Patient is a 63y old  Male who presents with a chief complaint of Left foot ulcer (20 Sep 2019 14:36)      SUBJECTIVE / OVERNIGHT EVENTS:    MEDICATIONS  (STANDING):  aspirin enteric coated 81 milliGRAM(s) Oral daily  clopidogrel Tablet 75 milliGRAM(s) Oral daily  collagenase Ointment 1 Application(s) Topical daily  dextrose 5%. 1000 milliLiter(s) (50 mL/Hr) IV Continuous <Continuous>  dextrose 50% Injectable 12.5 Gram(s) IV Push once  dextrose 50% Injectable 25 Gram(s) IV Push once  dextrose 50% Injectable 25 Gram(s) IV Push once  enoxaparin Injectable 40 milliGRAM(s) SubCutaneous daily  escitalopram 20 milliGRAM(s) Oral daily  influenza   Vaccine 0.5 milliLiter(s) IntraMuscular once  insulin lispro (HumaLOG) corrective regimen sliding scale   SubCutaneous three times a day before meals  insulin lispro (HumaLOG) corrective regimen sliding scale   SubCutaneous at bedtime  lidocaine 1% Injectable 20 milliLiter(s) Local Injection Once  lisinopril 5 milliGRAM(s) Oral daily  metoprolol succinate ER 50 milliGRAM(s) Oral daily  piperacillin/tazobactam IVPB.. 3.375 Gram(s) IV Intermittent every 8 hours  simvastatin 10 milliGRAM(s) Oral at bedtime  sodium chloride 0.9%. 500 milliLiter(s) (75 mL/Hr) IV Continuous <Continuous>    MEDICATIONS  (PRN):  dextrose 40% Gel 15 Gram(s) Oral once PRN Blood Glucose LESS THAN 70 milliGRAM(s)/deciliter  glucagon  Injectable 1 milliGRAM(s) IntraMuscular once PRN Glucose LESS THAN 70 milligrams/deciliter  nicotine - Inhaler 1 Each Inhalation every 6 hours PRN nicotine withdrawal      Vital Signs Last 24 Hrs  T(C): 36.9 (21 Sep 2019 06:20), Max: 37 (20 Sep 2019 11:49)  T(F): 98.4 (21 Sep 2019 06:20), Max: 98.6 (20 Sep 2019 11:49)  HR: 97 (21 Sep 2019 06:20) (90 - 105)  BP: 108/67 (21 Sep 2019 06:20) (99/64 - 118/71)  BP(mean): --  RR: 17 (21 Sep 2019 06:20) (17 - 19)  SpO2: 97% (21 Sep 2019 06:20) (95% - 98%)  CAPILLARY BLOOD GLUCOSE      POCT Blood Glucose.: 138 mg/dL (20 Sep 2019 18:10)  POCT Blood Glucose.: 135 mg/dL (20 Sep 2019 15:30)  POCT Blood Glucose.: 166 mg/dL (20 Sep 2019 09:52)  POCT Blood Glucose.: 177 mg/dL (20 Sep 2019 08:44)    I&O's Summary      PHYSICAL EXAM:  Vital Signs Last 24 Hrs  T(C): 36.9 (09-21-19 @ 06:20)  T(F): 98.4 (09-21-19 @ 06:20), Max: 98.6 (09-20-19 @ 11:49)  HR: 97 (09-21-19 @ 06:20) (90 - 105)  BP: 108/67 (09-21-19 @ 06:20)  BP(mean): --  RR: 17 (09-21-19 @ 06:20) (17 - 19)  SpO2: 97% (09-21-19 @ 06:20) (95% - 98%)  Wt(kg): --    Constitutional: NAD, awake and alert  EYES: EOMI  ENT:  Normal Hearing, no tonsillar exudates   Neck: Soft and supple , no thyromegaly   Respiratory: Breath sounds are clear bilaterally, No wheezing, rales or rhonchi  Cardiovascular: S1 and S2, regular rate and rhythm, no Murmurs, gallops or rubs, no JVD,    Gastrointestinal: Bowel Sounds present, soft, nontender, nondistended, no guarding, no rebound  Extremities: No cyanosis or clubbing; warm to touch  Vascular: 2+ peripheral pulses lower ex  Neurological: No focal deficits, CN II-XII intact bilaterally, sensation to light touch intact in all extremities, gait intact. Pupils are equally reactive to light and symmetrical in size.   Musculoskeletal: 5/5 strength b/l upper and lower extremities; no joint swelling.  Skin: No rashes  Psych: no depression or anhedonia, AAOx3  HEME: no bruises, no nose bleeds      LABS:                        16.3   14.24 )-----------( 343      ( 20 Sep 2019 06:00 )             50.4     09-20    138  |  99  |  16  ----------------------------<  166<H>  4.0   |  22  |  0.81    Ca    9.7      20 Sep 2019 06:00      PT/INR - ( 20 Sep 2019 06:00 )   PT: 12.5 SEC;   INR: 1.12          PTT - ( 20 Sep 2019 06:00 )  PTT:33.7 SEC          RADIOLOGY & ADDITIONAL TESTS:    Imaging Personally Reviewed:    Consultant(s) Notes Reviewed:      Care Discussed with Consultants/Other Providers: Patient is a 63y old  Male who presents with a chief complaint of Left foot ulcer (20 Sep 2019 14:36)      SUBJECTIVE / OVERNIGHT EVENTS: Patient evaluated at bedside. TWYLA. Patient complaining of R groin pain (procedure site), b/l foot numbness. No other complaints. Denies fever, N/V, HA, SOB, lightheadedness, dizziness, weakness. No other concerns presented at time of interview.      CONSTITUTIONAL: No weakness, fevers or chills  EYES/ENT: No visual changes;  No vertigo or throat pain   NECK: No pain or stiffness  RESPIRATORY: No cough, wheezing, hemoptysis; No shortness of breath  CARDIOVASCULAR: No chest pain or palpitations  GASTROINTESTINAL: No abdominal or epigastric pain. No nausea, vomiting, or hematemesis; No diarrhea or constipation. No melena or hematochezia.  GENITOURINARY: No dysuria, frequency or hematuria  NEUROLOGICAL: +R groin pain (procedure site); + b/l LE numbess. No weakness  SKIN: No itching, burning, rashes, or lesions   All other review of systems is negative unless indicated above.    MEDICATIONS  (STANDING):  aspirin enteric coated 81 milliGRAM(s) Oral daily  clopidogrel Tablet 75 milliGRAM(s) Oral daily  collagenase Ointment 1 Application(s) Topical daily  dextrose 5%. 1000 milliLiter(s) (50 mL/Hr) IV Continuous <Continuous>  dextrose 50% Injectable 12.5 Gram(s) IV Push once  dextrose 50% Injectable 25 Gram(s) IV Push once  dextrose 50% Injectable 25 Gram(s) IV Push once  enoxaparin Injectable 40 milliGRAM(s) SubCutaneous daily  escitalopram 20 milliGRAM(s) Oral daily  influenza   Vaccine 0.5 milliLiter(s) IntraMuscular once  insulin lispro (HumaLOG) corrective regimen sliding scale   SubCutaneous three times a day before meals  insulin lispro (HumaLOG) corrective regimen sliding scale   SubCutaneous at bedtime  lidocaine 1% Injectable 20 milliLiter(s) Local Injection Once  lisinopril 5 milliGRAM(s) Oral daily  metoprolol succinate ER 50 milliGRAM(s) Oral daily  piperacillin/tazobactam IVPB.. 3.375 Gram(s) IV Intermittent every 8 hours  simvastatin 10 milliGRAM(s) Oral at bedtime  sodium chloride 0.9%. 500 milliLiter(s) (75 mL/Hr) IV Continuous <Continuous>    MEDICATIONS  (PRN):  dextrose 40% Gel 15 Gram(s) Oral once PRN Blood Glucose LESS THAN 70 milliGRAM(s)/deciliter  glucagon  Injectable 1 milliGRAM(s) IntraMuscular once PRN Glucose LESS THAN 70 milligrams/deciliter  nicotine - Inhaler 1 Each Inhalation every 6 hours PRN nicotine withdrawal      Vital Signs Last 24 Hrs  T(C): 36.9 (21 Sep 2019 06:20), Max: 37 (20 Sep 2019 11:49)  T(F): 98.4 (21 Sep 2019 06:20), Max: 98.6 (20 Sep 2019 11:49)  HR: 97 (21 Sep 2019 06:20) (90 - 105)  BP: 108/67 (21 Sep 2019 06:20) (99/64 - 118/71)  BP(mean): --  RR: 17 (21 Sep 2019 06:20) (17 - 19)  SpO2: 97% (21 Sep 2019 06:20) (95% - 98%)  CAPILLARY BLOOD GLUCOSE      POCT Blood Glucose.: 138 mg/dL (20 Sep 2019 18:10)  POCT Blood Glucose.: 135 mg/dL (20 Sep 2019 15:30)  POCT Blood Glucose.: 166 mg/dL (20 Sep 2019 09:52)  POCT Blood Glucose.: 177 mg/dL (20 Sep 2019 08:44)    I&O's Summary      PHYSICAL EXAM:  Vital Signs Last 24 Hrs  T(C): 36.9 (09-21-19 @ 06:20)  T(F): 98.4 (09-21-19 @ 06:20), Max: 98.6 (09-20-19 @ 11:49)  HR: 97 (09-21-19 @ 06:20) (90 - 105)  BP: 108/67 (09-21-19 @ 06:20)  BP(mean): --  RR: 17 (09-21-19 @ 06:20) (17 - 19)  SpO2: 97% (09-21-19 @ 06:20) (95% - 98%)  Wt(kg): --    Constitutional: NAD, awake and alert  EYES: +PERRL, +EOMI, no scleral icterus  ENT: Moist oral mucosa  Respiratory: CTAB; no wheezing, rales or rhonchi  Cardiovascular: +S1/S2; RRR; no murmurs, gallops or rubs    Gastrointestinal: Soft, nontender, nondistended; +BS  Genitourinary: Tenderness in R groin (procedure site)  Extremities: No cyanosis or clubbing; warm to touch  Vascular: 2+ peripheral pulses x b/l LE  Neurological: Slightly decreased sensation in b/l LE; CN II-XII intact bilaterally. A&Ox3.    Musculoskeletal: 5/5 strength b/l upper and lower extremities; no joint swelling. Nontender LLE.  Skin: Blackened ulcer on L foot with very mild erythema      LABS:                        16.3   14.24 )-----------( 343      ( 20 Sep 2019 06:00 )             50.4     09-20    138  |  99  |  16  ----------------------------<  166<H>  4.0   |  22  |  0.81    Ca    9.7      20 Sep 2019 06:00      PT/INR - ( 20 Sep 2019 06:00 )   PT: 12.5 SEC;   INR: 1.12          PTT - ( 20 Sep 2019 06:00 )  PTT:33.7 SEC          RADIOLOGY & ADDITIONAL TESTS:    Imaging Personally Reviewed:    Consultant(s) Notes Reviewed:      Care Discussed with Consultants/Other Providers: Patient is a 63y old  Male who presents with a chief complaint of Left foot ulcer (20 Sep 2019 14:36)      SUBJECTIVE / OVERNIGHT EVENTS: Patient evaluated at bedside. TWYLA. Patient complaining of R groin pain (procedure site), b/l foot numbness. No other complaints. Denies fever, N/V, HA, SOB, lightheadedness, dizziness, weakness. No other concerns presented at time of interview.      CONSTITUTIONAL: No weakness, fevers or chills  EYES/ENT: No visual changes;  No vertigo or throat pain   NECK: No pain or stiffness  RESPIRATORY: No cough, wheezing, hemoptysis; No shortness of breath  CARDIOVASCULAR: No chest pain or palpitations  GASTROINTESTINAL: No abdominal or epigastric pain. No nausea, vomiting, or hematemesis; No diarrhea or constipation. No melena or hematochezia.  GENITOURINARY: No dysuria, frequency or hematuria  NEUROLOGICAL: +R groin pain (procedure site); + b/l LE numbess. No weakness  SKIN: No itching, burning, rashes, or lesions   All other review of systems is negative unless indicated above.    MEDICATIONS  (STANDING):  aspirin enteric coated 81 milliGRAM(s) Oral daily  clopidogrel Tablet 75 milliGRAM(s) Oral daily  collagenase Ointment 1 Application(s) Topical daily  dextrose 5%. 1000 milliLiter(s) (50 mL/Hr) IV Continuous <Continuous>  dextrose 50% Injectable 12.5 Gram(s) IV Push once  dextrose 50% Injectable 25 Gram(s) IV Push once  dextrose 50% Injectable 25 Gram(s) IV Push once  enoxaparin Injectable 40 milliGRAM(s) SubCutaneous daily  escitalopram 20 milliGRAM(s) Oral daily  influenza   Vaccine 0.5 milliLiter(s) IntraMuscular once  insulin lispro (HumaLOG) corrective regimen sliding scale   SubCutaneous three times a day before meals  insulin lispro (HumaLOG) corrective regimen sliding scale   SubCutaneous at bedtime  lidocaine 1% Injectable 20 milliLiter(s) Local Injection Once  lisinopril 5 milliGRAM(s) Oral daily  metoprolol succinate ER 50 milliGRAM(s) Oral daily  piperacillin/tazobactam IVPB.. 3.375 Gram(s) IV Intermittent every 8 hours  simvastatin 10 milliGRAM(s) Oral at bedtime  sodium chloride 0.9%. 500 milliLiter(s) (75 mL/Hr) IV Continuous <Continuous>    MEDICATIONS  (PRN):  dextrose 40% Gel 15 Gram(s) Oral once PRN Blood Glucose LESS THAN 70 milliGRAM(s)/deciliter  glucagon  Injectable 1 milliGRAM(s) IntraMuscular once PRN Glucose LESS THAN 70 milligrams/deciliter  nicotine - Inhaler 1 Each Inhalation every 6 hours PRN nicotine withdrawal      Vital Signs Last 24 Hrs  T(C): 36.9 (21 Sep 2019 06:20), Max: 37 (20 Sep 2019 11:49)  T(F): 98.4 (21 Sep 2019 06:20), Max: 98.6 (20 Sep 2019 11:49)  HR: 97 (21 Sep 2019 06:20) (90 - 105)  BP: 108/67 (21 Sep 2019 06:20) (99/64 - 118/71)  BP(mean): --  RR: 17 (21 Sep 2019 06:20) (17 - 19)  SpO2: 97% (21 Sep 2019 06:20) (95% - 98%)  CAPILLARY BLOOD GLUCOSE      POCT Blood Glucose.: 138 mg/dL (20 Sep 2019 18:10)  POCT Blood Glucose.: 135 mg/dL (20 Sep 2019 15:30)  POCT Blood Glucose.: 166 mg/dL (20 Sep 2019 09:52)  POCT Blood Glucose.: 177 mg/dL (20 Sep 2019 08:44)    I&O's Summary      PHYSICAL EXAM:  Vital Signs Last 24 Hrs  T(C): 36.9 (09-21-19 @ 06:20)  T(F): 98.4 (09-21-19 @ 06:20), Max: 98.6 (09-20-19 @ 11:49)  HR: 97 (09-21-19 @ 06:20) (90 - 105)  BP: 108/67 (09-21-19 @ 06:20)  BP(mean): --  RR: 17 (09-21-19 @ 06:20) (17 - 19)  SpO2: 97% (09-21-19 @ 06:20) (95% - 98%)  Wt(kg): --    Constitutional: NAD, awake and alert  EYES: +PERRL, +EOMI, no scleral icterus  ENT: Moist oral mucosa  Respiratory: CTAB; no wheezing, rales or rhonchi  Cardiovascular: +S1/S2; RRR; no murmurs, gallops or rubs    Gastrointestinal: Soft, nontender, nondistended; +BS  Genitourinary: Tenderness in R groin (procedure site); no hematoma, no swelling or fluctuance, no erythema present. 1+ R femoral pulse present  Extremities: No cyanosis or clubbing; warm to touch  Vascular: 2+ peripheral pulses x b/l LE  Neurological: Slightly decreased sensation in b/l LE; CN II-XII intact bilaterally. A&Ox3.    Musculoskeletal: 5/5 strength b/l upper and lower extremities; no joint swelling. Nontender LLE.  Skin: Blackened ulcer on L foot with very mild erythema      LABS:                        16.3   14.24 )-----------( 343      ( 20 Sep 2019 06:00 )             50.4     09-20    138  |  99  |  16  ----------------------------<  166<H>  4.0   |  22  |  0.81    Ca    9.7      20 Sep 2019 06:00      PT/INR - ( 20 Sep 2019 06:00 )   PT: 12.5 SEC;   INR: 1.12          PTT - ( 20 Sep 2019 06:00 )  PTT:33.7 SEC          RADIOLOGY & ADDITIONAL TESTS:    Imaging Personally Reviewed:    Consultant(s) Notes Reviewed:      Care Discussed with Consultants/Other Providers:

## 2019-09-21 NOTE — CHART NOTE - NSCHARTNOTEFT_GEN_A_CORE
Bed check requested by day team. Patient s/p R sided angiogram w/ femoral cutdown. Procedure site dressing is clean/dry/intact. No erythema, swelling or drainage. 1+ femoral pulses bilaterally.     -NF (Team 4, CMA, CMB)

## 2019-09-21 NOTE — PROGRESS NOTE ADULT - SUBJECTIVE AND OBJECTIVE BOX
Cardiology/Vascular Medicine Inpatient Progress Note    Status post LLE angioplasty  Receiving IV Zosyn for toe OM  Palpable L AT  No new complaints    Vital Signs Last 24 Hrs  T(C): 36.9 (21 Sep 2019 06:20), Max: 37 (20 Sep 2019 18:45)  T(F): 98.4 (21 Sep 2019 06:20), Max: 98.6 (20 Sep 2019 18:45)  HR: 97 (21 Sep 2019 06:20) (90 - 105)  BP: 108/67 (21 Sep 2019 06:20) (105/58 - 118/71)  BP(mean): --  RR: 17 (21 Sep 2019 06:20) (17 - 19)  SpO2: 97% (21 Sep 2019 06:20) (95% - 97%)    I&O's Detail  None recorded    Appearance: NAD  HEENT:   Normal oral mucosa, PERRL, EOMI	  Cardiovascular: Normal S1 S2, No JVD, No murmurs, No edema  Respiratory: Decreased breath sounds bilateral bases	  Psychiatry: Awake, alert  Gastrointestinal:  Soft, Non-tender, + BS	  Skin: No rashes, No ecchymoses, No cyanosis	  Neurologic: Non-focal  Extremities: +Dressing Normal range of motion, No clubbing, cyanosis or edema    MEDICATIONS  (STANDING):  aspirin enteric coated 81 milliGRAM(s) Oral daily  clopidogrel Tablet 75 milliGRAM(s) Oral daily  collagenase Ointment 1 Application(s) Topical daily  dextrose 5%. 1000 milliLiter(s) (50 mL/Hr) IV Continuous <Continuous>  dextrose 50% Injectable 12.5 Gram(s) IV Push once  dextrose 50% Injectable 25 Gram(s) IV Push once  dextrose 50% Injectable 25 Gram(s) IV Push once  enoxaparin Injectable 40 milliGRAM(s) SubCutaneous daily  escitalopram 20 milliGRAM(s) Oral daily  influenza   Vaccine 0.5 milliLiter(s) IntraMuscular once  insulin lispro (HumaLOG) corrective regimen sliding scale   SubCutaneous three times a day before meals  insulin lispro (HumaLOG) corrective regimen sliding scale   SubCutaneous at bedtime  lidocaine 1% Injectable 20 milliLiter(s) Local Injection Once  lisinopril 5 milliGRAM(s) Oral daily  metoprolol succinate ER 50 milliGRAM(s) Oral daily  piperacillin/tazobactam IVPB.. 3.375 Gram(s) IV Intermittent every 8 hours  simvastatin 10 milliGRAM(s) Oral at bedtime  sodium chloride 0.9%. 500 milliLiter(s) (75 mL/Hr) IV Continuous <Continuous>    MEDICATIONS  (PRN):  dextrose 40% Gel 15 Gram(s) Oral once PRN Blood Glucose LESS THAN 70 milliGRAM(s)/deciliter  glucagon  Injectable 1 milliGRAM(s) IntraMuscular once PRN Glucose LESS THAN 70 milligrams/deciliter  nicotine - Inhaler 1 Each Inhalation every 6 hours PRN nicotine withdrawal        LABS:	 	                          16.3   14.24 )-----------( 343      ( 20 Sep 2019 06:00 )             50.4     09-20    138  |  99  |  16  ----------------------------<  166<H>  4.0   |  22  |  0.81    Ca    9.7      20 Sep 2019 06:00    PT/INR - ( 20 Sep 2019 06:00 )   PT: 12.5 SEC;   INR: 1.12     PTT - ( 20 Sep 2019 06:00 )  PTT:33.7 SEC             < from: Xray Foot AP + Lateral + Oblique, Left (09.17.19 @ 12:54) >    EXAM:  RAD FOOT MIN 3 VIEWS LT      EXAM:  RAD ANKLE MIN 3 VIEWS LEFT        PROCEDURE DATE:  Sep 17 2019         INTERPRETATION:  CLINICAL INDICATION: left ankle/foot ulceration;   evaluate for deep infection    EXAM:  Frontal, oblique, and lateral left ankle and foot from 9/17/2019 at 1254.   IMPRESSION no similar    IMPRESSION:  Suggestion of a shallow posterior heel ulceration. No tracking gas   collections beyond this region and no gross radiographic evidence for   underlying osteomyelitis.    Chronic corticated ossification adjacent to medial malleolar tip, chronic   mild bony productive changes along talofibular margins, and thick   bridging ossification across distal tibiofibular syndesmotic space   consistent with old posttraumaticchange.    No dislocations or acute appearing fractures.    Tarsometatarsal alignment maintained without evidence for a Lisfranc   injury.    Mild tibiotalar arthrosis with subarticular cystic change along the   tibial plafond also apparent. Congenitally fused 5th DIP joint Preserved   remaining joint spaces and no joint margin erosions.     Thick plantar calcaneal enthesophyte with adjacent separate discontinuous   chronic ossifications.    No discrete lytic or blastic lesions.      SHAN HAND M.D., ATTENDING RADIOLOGIST  This document has been electronically signed. Sep 17 2019  1:52PM

## 2019-09-21 NOTE — PROGRESS NOTE ADULT - ASSESSMENT
63 year old male with history of HTN, T2DM (on insulin), CAD s/p stents was sent in by podiatrist for a left foot ulcer, admitted for further management. Poorly healing LLE ulcer in the setting of LLE arterial disease, to undergo angiography. MRI suggestive of LORI, ID following, on Vancomycin, Zosyn 63 year old male with history of HTN, T2DM (on insulin), CAD s/p stents was sent in by podiatrist for a left foot ulcer, admitted for further management. Poorly healing LLE ulcer in the setting of LLE arterial disease, s/p angiogram with angioplasty. Undergoing treatment for OM.

## 2019-09-22 LAB
ALBUMIN SERPL ELPH-MCNC: 3.6 G/DL — SIGNIFICANT CHANGE UP (ref 3.3–5)
ALP SERPL-CCNC: 81 U/L — SIGNIFICANT CHANGE UP (ref 40–120)
ALT FLD-CCNC: 11 U/L — SIGNIFICANT CHANGE UP (ref 4–41)
ANION GAP SERPL CALC-SCNC: 17 MMO/L — HIGH (ref 7–14)
AST SERPL-CCNC: 12 U/L — SIGNIFICANT CHANGE UP (ref 4–40)
BILIRUB SERPL-MCNC: 0.5 MG/DL — SIGNIFICANT CHANGE UP (ref 0.2–1.2)
BUN SERPL-MCNC: 15 MG/DL — SIGNIFICANT CHANGE UP (ref 7–23)
CALCIUM SERPL-MCNC: 9.5 MG/DL — SIGNIFICANT CHANGE UP (ref 8.4–10.5)
CHLORIDE SERPL-SCNC: 98 MMOL/L — SIGNIFICANT CHANGE UP (ref 98–107)
CO2 SERPL-SCNC: 20 MMOL/L — LOW (ref 22–31)
CREAT SERPL-MCNC: 0.83 MG/DL — SIGNIFICANT CHANGE UP (ref 0.5–1.3)
GLUCOSE SERPL-MCNC: 190 MG/DL — HIGH (ref 70–99)
HCT VFR BLD CALC: 48.6 % — SIGNIFICANT CHANGE UP (ref 39–50)
HGB BLD-MCNC: 15.6 G/DL — SIGNIFICANT CHANGE UP (ref 13–17)
MAGNESIUM SERPL-MCNC: 2 MG/DL — SIGNIFICANT CHANGE UP (ref 1.6–2.6)
MCHC RBC-ENTMCNC: 27.7 PG — SIGNIFICANT CHANGE UP (ref 27–34)
MCHC RBC-ENTMCNC: 32.1 % — SIGNIFICANT CHANGE UP (ref 32–36)
MCV RBC AUTO: 86.2 FL — SIGNIFICANT CHANGE UP (ref 80–100)
NRBC # FLD: 0 K/UL — SIGNIFICANT CHANGE UP (ref 0–0)
PHOSPHATE SERPL-MCNC: 2.9 MG/DL — SIGNIFICANT CHANGE UP (ref 2.5–4.5)
PLATELET # BLD AUTO: 315 K/UL — SIGNIFICANT CHANGE UP (ref 150–400)
PMV BLD: 9.8 FL — SIGNIFICANT CHANGE UP (ref 7–13)
POTASSIUM SERPL-MCNC: 4.1 MMOL/L — SIGNIFICANT CHANGE UP (ref 3.5–5.3)
POTASSIUM SERPL-SCNC: 4.1 MMOL/L — SIGNIFICANT CHANGE UP (ref 3.5–5.3)
PROT SERPL-MCNC: 7.6 G/DL — SIGNIFICANT CHANGE UP (ref 6–8.3)
RBC # BLD: 5.64 M/UL — SIGNIFICANT CHANGE UP (ref 4.2–5.8)
RBC # FLD: 13.2 % — SIGNIFICANT CHANGE UP (ref 10.3–14.5)
SODIUM SERPL-SCNC: 135 MMOL/L — SIGNIFICANT CHANGE UP (ref 135–145)
WBC # BLD: 15.58 K/UL — HIGH (ref 3.8–10.5)
WBC # FLD AUTO: 15.58 K/UL — HIGH (ref 3.8–10.5)

## 2019-09-22 PROCEDURE — 99232 SBSQ HOSP IP/OBS MODERATE 35: CPT | Mod: GC

## 2019-09-22 RX ADMIN — ESCITALOPRAM OXALATE 20 MILLIGRAM(S): 10 TABLET, FILM COATED ORAL at 14:09

## 2019-09-22 RX ADMIN — LISINOPRIL 5 MILLIGRAM(S): 2.5 TABLET ORAL at 05:40

## 2019-09-22 RX ADMIN — SIMVASTATIN 10 MILLIGRAM(S): 20 TABLET, FILM COATED ORAL at 21:33

## 2019-09-22 RX ADMIN — ENOXAPARIN SODIUM 40 MILLIGRAM(S): 100 INJECTION SUBCUTANEOUS at 14:10

## 2019-09-22 RX ADMIN — PIPERACILLIN AND TAZOBACTAM 25 GRAM(S): 4; .5 INJECTION, POWDER, LYOPHILIZED, FOR SOLUTION INTRAVENOUS at 14:10

## 2019-09-22 RX ADMIN — CLOPIDOGREL BISULFATE 75 MILLIGRAM(S): 75 TABLET, FILM COATED ORAL at 14:10

## 2019-09-22 RX ADMIN — Medication 2: at 14:08

## 2019-09-22 RX ADMIN — PIPERACILLIN AND TAZOBACTAM 25 GRAM(S): 4; .5 INJECTION, POWDER, LYOPHILIZED, FOR SOLUTION INTRAVENOUS at 05:40

## 2019-09-22 RX ADMIN — PIPERACILLIN AND TAZOBACTAM 25 GRAM(S): 4; .5 INJECTION, POWDER, LYOPHILIZED, FOR SOLUTION INTRAVENOUS at 21:33

## 2019-09-22 RX ADMIN — Medication 1 APPLICATION(S): at 12:57

## 2019-09-22 RX ADMIN — Medication 81 MILLIGRAM(S): at 14:09

## 2019-09-22 RX ADMIN — Medication 50 MILLIGRAM(S): at 05:40

## 2019-09-22 NOTE — PROGRESS NOTE ADULT - PROBLEM SELECTOR PLAN 2
- Ulcer on LLE x 3 weeks, poorly healing ulcer  - Venous duplex - L AVELINA decreased at 0.63, L TBI decreased at 0.33 - indicative of arterial disease  - S/p angiogram with angioplasty  - Wound Cx - Klebsiella, S. aureus, Coag negative Staph  - Podiatry following, no plan for surgical intervention  - C/w zosyn, ID following  - Plan for PICC placement for outpatient IV course - Presented w/ulcer on LLE x 3 weeks, poorly healing ulcer  - Venous duplex - L AVELINA decreased at 0.63, L TBI decreased at 0.33 - indicative of arterial disease  - S/p angiogram with angioplasty  - Wound Cx - Klebsiella, S. aureus, Coag negative Staph  - Podiatry following, no plan for surgical intervention  - C/w zosyn, ID following  - Plan for PICC placement for outpatient IV course - Presented w/ulcer on LLE x 3 weeks, poorly healing ulcer  - Venous duplex indicative of LLE arterial disease  - S/p angiogram with angioplasty  - Wound Cx - Klebsiella, S. aureus, Coag negative Staph  - Podiatry following, no plan for surgical intervention  - C/w zosyn, ID following  - F/u w/ID regarding PICC placement vs oral Abx

## 2019-09-22 NOTE — PROVIDER CONTACT NOTE (OTHER) - ASSESSMENT
Pt alert, and oriented, no signs of distress noted. Pt stated that he use hid own device to check his blood sugar at 20:20 with result 234

## 2019-09-22 NOTE — PROGRESS NOTE ADULT - SUBJECTIVE AND OBJECTIVE BOX
Patient is a 63y old  Male who presents with a chief complaint of Left foot ulcer (21 Sep 2019 12:47)      SUBJECTIVE / OVERNIGHT EVENTS:    MEDICATIONS  (STANDING):  aspirin enteric coated 81 milliGRAM(s) Oral daily  clopidogrel Tablet 75 milliGRAM(s) Oral daily  collagenase Ointment 1 Application(s) Topical daily  dextrose 5%. 1000 milliLiter(s) (50 mL/Hr) IV Continuous <Continuous>  dextrose 50% Injectable 12.5 Gram(s) IV Push once  dextrose 50% Injectable 25 Gram(s) IV Push once  dextrose 50% Injectable 25 Gram(s) IV Push once  enoxaparin Injectable 40 milliGRAM(s) SubCutaneous daily  escitalopram 20 milliGRAM(s) Oral daily  influenza   Vaccine 0.5 milliLiter(s) IntraMuscular once  insulin lispro (HumaLOG) corrective regimen sliding scale   SubCutaneous three times a day before meals  insulin lispro (HumaLOG) corrective regimen sliding scale   SubCutaneous at bedtime  lidocaine 1% Injectable 20 milliLiter(s) Local Injection Once  lisinopril 5 milliGRAM(s) Oral daily  metoprolol succinate ER 50 milliGRAM(s) Oral daily  piperacillin/tazobactam IVPB.. 3.375 Gram(s) IV Intermittent every 8 hours  simvastatin 10 milliGRAM(s) Oral at bedtime  sodium chloride 0.9%. 500 milliLiter(s) (75 mL/Hr) IV Continuous <Continuous>    MEDICATIONS  (PRN):  dextrose 40% Gel 15 Gram(s) Oral once PRN Blood Glucose LESS THAN 70 milliGRAM(s)/deciliter  glucagon  Injectable 1 milliGRAM(s) IntraMuscular once PRN Glucose LESS THAN 70 milligrams/deciliter  nicotine - Inhaler 1 Each Inhalation every 6 hours PRN nicotine withdrawal      Vital Signs Last 24 Hrs  T(C): 36.6 (22 Sep 2019 05:38), Max: 37 (21 Sep 2019 22:05)  T(F): 97.8 (22 Sep 2019 05:38), Max: 98.6 (21 Sep 2019 22:05)  HR: 99 (22 Sep 2019 05:38) (98 - 102)  BP: 117/64 (22 Sep 2019 05:38) (117/64 - 122/78)  BP(mean): --  RR: 17 (22 Sep 2019 05:38) (17 - 18)  SpO2: 100% (22 Sep 2019 05:38) (97% - 100%)  CAPILLARY BLOOD GLUCOSE      POCT Blood Glucose.: 211 mg/dL (21 Sep 2019 18:00)  POCT Blood Glucose.: 213 mg/dL (21 Sep 2019 12:45)  POCT Blood Glucose.: 194 mg/dL (21 Sep 2019 09:06)    I&O's Summary      PHYSICAL EXAM:  Vital Signs Last 24 Hrs  T(C): 36.6 (09-22-19 @ 05:38)  T(F): 97.8 (09-22-19 @ 05:38), Max: 98.6 (09-21-19 @ 22:05)  HR: 99 (09-22-19 @ 05:38) (98 - 102)  BP: 117/64 (09-22-19 @ 05:38)  BP(mean): --  RR: 17 (09-22-19 @ 05:38) (17 - 18)  SpO2: 100% (09-22-19 @ 05:38) (97% - 100%)  Wt(kg): --    Constitutional: NAD, awake and alert  EYES: EOMI  ENT:  Normal Hearing, no tonsillar exudates   Neck: Soft and supple , no thyromegaly   Respiratory: Breath sounds are clear bilaterally, No wheezing, rales or rhonchi  Cardiovascular: S1 and S2, regular rate and rhythm, no Murmurs, gallops or rubs, no JVD,    Gastrointestinal: Bowel Sounds present, soft, nontender, nondistended, no guarding, no rebound  Extremities: No cyanosis or clubbing; warm to touch  Vascular: 2+ peripheral pulses lower ex  Neurological: No focal deficits, CN II-XII intact bilaterally, sensation to light touch intact in all extremities, gait intact. Pupils are equally reactive to light and symmetrical in size.   Musculoskeletal: 5/5 strength b/l upper and lower extremities; no joint swelling.  Skin: No rashes  Psych: no depression or anhedonia, AAOx3  HEME: no bruises, no nose bleeds      LABS:                    RADIOLOGY & ADDITIONAL TESTS:    Imaging Personally Reviewed:    Consultant(s) Notes Reviewed:      Care Discussed with Consultants/Other Providers: Patient is a 63y old  Male who presents with a chief complaint of Left foot ulcer (21 Sep 2019 12:47)      SUBJECTIVE / OVERNIGHT EVENTS: Patient evaluated at bedside. TWYLA. Patient complaining of residual R groin pain, though it is better than yesterday. He also states that he has still has numbness in both feet. Denies fever, SOB, foot pain, N/V, lightheadedness, dizziness. No other complaints or pain presented at time of interview.      CONSTITUTIONAL: No weakness, fevers or chills  EYES/ENT: No visual changes;  No vertigo or throat pain   NECK: No pain or stiffness  RESPIRATORY: No cough, wheezing, hemoptysis; No shortness of breath  CARDIOVASCULAR: No chest pain or palpitations  GASTROINTESTINAL: No abdominal or epigastric pain. No nausea, vomiting, or hematemesis; No diarrhea or constipation. No melena or hematochezia.  GENITOURINARY: No dysuria, frequency or hematuria  NEUROLOGICAL: +R groin pain. B/l LE numbness. No weakness.  SKIN: No itching, burning, rashes, or lesions   All other review of systems is negative unless indicated above.	    MEDICATIONS  (STANDING):  aspirin enteric coated 81 milliGRAM(s) Oral daily  clopidogrel Tablet 75 milliGRAM(s) Oral daily  collagenase Ointment 1 Application(s) Topical daily  dextrose 5%. 1000 milliLiter(s) (50 mL/Hr) IV Continuous <Continuous>  dextrose 50% Injectable 12.5 Gram(s) IV Push once  dextrose 50% Injectable 25 Gram(s) IV Push once  dextrose 50% Injectable 25 Gram(s) IV Push once  enoxaparin Injectable 40 milliGRAM(s) SubCutaneous daily  escitalopram 20 milliGRAM(s) Oral daily  influenza   Vaccine 0.5 milliLiter(s) IntraMuscular once  insulin lispro (HumaLOG) corrective regimen sliding scale   SubCutaneous three times a day before meals  insulin lispro (HumaLOG) corrective regimen sliding scale   SubCutaneous at bedtime  lidocaine 1% Injectable 20 milliLiter(s) Local Injection Once  lisinopril 5 milliGRAM(s) Oral daily  metoprolol succinate ER 50 milliGRAM(s) Oral daily  piperacillin/tazobactam IVPB.. 3.375 Gram(s) IV Intermittent every 8 hours  simvastatin 10 milliGRAM(s) Oral at bedtime  sodium chloride 0.9%. 500 milliLiter(s) (75 mL/Hr) IV Continuous <Continuous>    MEDICATIONS  (PRN):  dextrose 40% Gel 15 Gram(s) Oral once PRN Blood Glucose LESS THAN 70 milliGRAM(s)/deciliter  glucagon  Injectable 1 milliGRAM(s) IntraMuscular once PRN Glucose LESS THAN 70 milligrams/deciliter  nicotine - Inhaler 1 Each Inhalation every 6 hours PRN nicotine withdrawal      Vital Signs Last 24 Hrs  T(C): 36.6 (22 Sep 2019 05:38), Max: 37 (21 Sep 2019 22:05)  T(F): 97.8 (22 Sep 2019 05:38), Max: 98.6 (21 Sep 2019 22:05)  HR: 99 (22 Sep 2019 05:38) (98 - 102)  BP: 117/64 (22 Sep 2019 05:38) (117/64 - 122/78)  BP(mean): --  RR: 17 (22 Sep 2019 05:38) (17 - 18)  SpO2: 100% (22 Sep 2019 05:38) (97% - 100%)  CAPILLARY BLOOD GLUCOSE      POCT Blood Glucose.: 211 mg/dL (21 Sep 2019 18:00)  POCT Blood Glucose.: 213 mg/dL (21 Sep 2019 12:45)  POCT Blood Glucose.: 194 mg/dL (21 Sep 2019 09:06)    I&O's Summary      PHYSICAL EXAM:  Vital Signs Last 24 Hrs  T(C): 36.6 (09-22-19 @ 05:38)  T(F): 97.8 (09-22-19 @ 05:38), Max: 98.6 (09-21-19 @ 22:05)  HR: 99 (09-22-19 @ 05:38) (98 - 102)  BP: 117/64 (09-22-19 @ 05:38)  BP(mean): --  RR: 17 (09-22-19 @ 05:38) (17 - 18)  SpO2: 100% (09-22-19 @ 05:38) (97% - 100%)  Wt(kg): --    Constitutional: NAD, awake and alert  EYES: +PERRL, +EOMI, no scleral icterus  ENT: Moist oral mucosa  Respiratory: CTAB; no wheezing, rales or rhonchi  Cardiovascular: +S1/S2; RRR; no murmurs, gallops or rubs    Gastrointestinal: Soft, nontender, nondistended; +BS  Genitourinary: Tenderness in R groin (procedure site); no hematoma, no swelling or fluctuance, no erythema present. 1+ R femoral pulse present  Extremities: No cyanosis or clubbing; warm to touch  Vascular: 2+ peripheral pulses x b/l LE  Neurological: Slightly decreased sensation in b/l LE; CN II-XII intact bilaterally. A&Ox3.    Musculoskeletal: 5/5 strength b/l upper and lower extremities; no joint swelling. Nontender LLE.  Skin: Closed ulcer on L foot with very mild erythema; no swelling, purulent discharge, or tenderness.      LABS:                          15.6   15.58 )-----------( 315      ( 22 Sep 2019 07:30 )             48.6     09-22    135  |  98  |  15  ----------------------------<  190<H>  4.1   |  20<L>  |  0.83    Ca    9.5      22 Sep 2019 07:30  Phos  2.9     09-22  Mg     2.0     09-22    TPro  7.6  /  Alb  3.6  /  TBili  0.5  /  DBili  x   /  AST  12  /  ALT  11  /  AlkPhos  81  09-22      RADIOLOGY & ADDITIONAL TESTS:    Imaging Personally Reviewed:    Consultant(s) Notes Reviewed:      Care Discussed with Consultants/Other Providers:

## 2019-09-22 NOTE — PROGRESS NOTE ADULT - PROBLEM SELECTOR PLAN 1
- MRI 9/18 suggestive of OM  - Wound Cx significant for Klebsiella, S. aureus, Coag negative Staph  - C/w zosyn, ID following  - Plan for PICC placement for outpatient IV course - MRI 9/18 suggestive of OM  - Wound Cx significant for Klebsiella, S. aureus, Coag negative Staph  - C/w zosyn, ID following  - F/u w/ID regarding PICC placement vs oral Abx

## 2019-09-22 NOTE — PROGRESS NOTE ADULT - ASSESSMENT
63 year old male with history of HTN, T2DM (on insulin), CAD s/p stents was sent in by podiatrist for a left foot ulcer, admitted for further management. Poorly healing LLE ulcer in the setting of LLE arterial disease, s/p angiogram with angioplasty. Undergoing treatment for OM.

## 2019-09-22 NOTE — PROGRESS NOTE ADULT - PROBLEM SELECTOR PLAN 5
- Blood pressure WNL  - Will hold lisinopril in anticipation of angiography/contrast administration - Blood pressure 108-122/64-78  - Monitor blood pressure

## 2019-09-22 NOTE — PROGRESS NOTE ADULT - PROBLEM SELECTOR PLAN 4
- History of diabetes, uses insulin at home  - HgbA1c 9.3  - -170  - C/w ISS  - Monitor glucose levels - History of diabetes, uses insulin at home  - HgbA1c 9.3  - -220  - C/w ISS  - Monitor glucose levels

## 2019-09-22 NOTE — PROGRESS NOTE ADULT - PROBLEM SELECTOR PLAN 3
- Venous duplex - L AVELINA decreased at 0.63, L TBI decreased at 0.33 - indicative of arterial disease, likely  - S/p angiogram/angioplasty

## 2019-09-23 LAB
GLUCOSE BLDC GLUCOMTR-MCNC: 211 MG/DL — HIGH (ref 70–99)
GLUCOSE BLDC GLUCOMTR-MCNC: 263 MG/DL — HIGH (ref 70–99)
HCT VFR BLD CALC: 43.9 % — SIGNIFICANT CHANGE UP (ref 39–50)
HGB BLD-MCNC: 14.5 G/DL — SIGNIFICANT CHANGE UP (ref 13–17)
MCHC RBC-ENTMCNC: 28.2 PG — SIGNIFICANT CHANGE UP (ref 27–34)
MCHC RBC-ENTMCNC: 33 % — SIGNIFICANT CHANGE UP (ref 32–36)
MCV RBC AUTO: 85.4 FL — SIGNIFICANT CHANGE UP (ref 80–100)
NRBC # FLD: 0 K/UL — SIGNIFICANT CHANGE UP (ref 0–0)
PLATELET # BLD AUTO: 313 K/UL — SIGNIFICANT CHANGE UP (ref 150–400)
PMV BLD: 9.7 FL — SIGNIFICANT CHANGE UP (ref 7–13)
RBC # BLD: 5.14 M/UL — SIGNIFICANT CHANGE UP (ref 4.2–5.8)
RBC # FLD: 13.1 % — SIGNIFICANT CHANGE UP (ref 10.3–14.5)
SPECIMEN SOURCE: SIGNIFICANT CHANGE UP
SPECIMEN SOURCE: SIGNIFICANT CHANGE UP
WBC # BLD: 14.93 K/UL — HIGH (ref 3.8–10.5)
WBC # FLD AUTO: 14.93 K/UL — HIGH (ref 3.8–10.5)

## 2019-09-23 PROCEDURE — 99233 SBSQ HOSP IP/OBS HIGH 50: CPT | Mod: GC

## 2019-09-23 PROCEDURE — 99233 SBSQ HOSP IP/OBS HIGH 50: CPT

## 2019-09-23 PROCEDURE — 99231 SBSQ HOSP IP/OBS SF/LOW 25: CPT

## 2019-09-23 RX ORDER — PIPERACILLIN AND TAZOBACTAM 4; .5 G/20ML; G/20ML
3.38 INJECTION, POWDER, LYOPHILIZED, FOR SOLUTION INTRAVENOUS EVERY 8 HOURS
Refills: 0 | Status: DISCONTINUED | OUTPATIENT
Start: 2019-09-23 | End: 2019-09-24

## 2019-09-23 RX ORDER — CEFTRIAXONE 500 MG/1
1 INJECTION, POWDER, FOR SOLUTION INTRAMUSCULAR; INTRAVENOUS
Qty: 1 | Refills: 0
Start: 2019-09-23 | End: 2019-11-06

## 2019-09-23 RX ORDER — CEFTRIAXONE 500 MG/1
2000 INJECTION, POWDER, FOR SOLUTION INTRAMUSCULAR; INTRAVENOUS EVERY 24 HOURS
Refills: 0 | Status: DISCONTINUED | OUTPATIENT
Start: 2019-09-23 | End: 2019-09-23

## 2019-09-23 RX ADMIN — Medication 50 MILLIGRAM(S): at 06:11

## 2019-09-23 RX ADMIN — SIMVASTATIN 10 MILLIGRAM(S): 20 TABLET, FILM COATED ORAL at 22:05

## 2019-09-23 RX ADMIN — PIPERACILLIN AND TAZOBACTAM 25 GRAM(S): 4; .5 INJECTION, POWDER, LYOPHILIZED, FOR SOLUTION INTRAVENOUS at 06:10

## 2019-09-23 RX ADMIN — PIPERACILLIN AND TAZOBACTAM 25 GRAM(S): 4; .5 INJECTION, POWDER, LYOPHILIZED, FOR SOLUTION INTRAVENOUS at 13:30

## 2019-09-23 RX ADMIN — LISINOPRIL 5 MILLIGRAM(S): 2.5 TABLET ORAL at 06:11

## 2019-09-23 RX ADMIN — ESCITALOPRAM OXALATE 20 MILLIGRAM(S): 10 TABLET, FILM COATED ORAL at 11:09

## 2019-09-23 RX ADMIN — Medication 3: at 13:35

## 2019-09-23 RX ADMIN — Medication 2: at 09:14

## 2019-09-23 RX ADMIN — ENOXAPARIN SODIUM 40 MILLIGRAM(S): 100 INJECTION SUBCUTANEOUS at 11:09

## 2019-09-23 RX ADMIN — Medication 1 APPLICATION(S): at 11:11

## 2019-09-23 RX ADMIN — Medication 81 MILLIGRAM(S): at 11:09

## 2019-09-23 RX ADMIN — CLOPIDOGREL BISULFATE 75 MILLIGRAM(S): 75 TABLET, FILM COATED ORAL at 11:09

## 2019-09-23 NOTE — PROGRESS NOTE ADULT - ASSESSMENT
63M with PAD and nonhealing left 5th metatarsal wound s/p LLE angiogram with angioplasty 9/20    - Patient with palpable AT pulse.  - Recommend aspirin and plavix.  - No further vascular surgery interventions at this time.  - Antibiotic plan per podiatry and ID.   - Please have patient follow up with vascular surgery Dr. Svetlana Bar as an outpatient, thank you.    Vascular Surgery (C Team Surgery)  [phone] with any questions

## 2019-09-23 NOTE — PROGRESS NOTE ADULT - SUBJECTIVE AND OBJECTIVE BOX
Patient is a 63y old  Male who presents with a chief complaint of Left foot ulcer (19 Sep 2019 10:04)    f/u foot infection    interval history/ROS:  no fever.  no rash.  foot feels okay.  ambulating.  Podiatry does not intend to operate and is relying on antibiotics to heel his OM.  wants to go home.  Remainder of ROS otherwise negative.    PAST MEDICAL & SURGICAL HISTORY:  Stented Coronary Artery  COPD (Chronic Obstructive Pulmonary Disease)  Old myocardial infarction  Diabetes Mellitus Type II  Hypertension  H/O: Varicose Veins: laser surgery  Knee Arthroplasty  Ankle Fracture: left repaired    Allergies  No Known Allergies    ANTIMICROBIALS:  vancomycin  IVPB 1250 every 12 hours (9/17-20)  piperacillin/tazobactam IVPB.. 3.375 every 8 hours (9/17-)    MEDICATIONS  (STANDING):  aspirin enteric coated 81 daily  clopidogrel Tablet 75 daily  enoxaparin Injectable 40 daily  escitalopram 20 daily  influenza   Vaccine 0.5 once  insulin lispro (HumaLOG) corrective regimen sliding scale  three times a day before meals  insulin lispro (HumaLOG) corrective regimen sliding scale  at bedtime  lisinopril 5 daily  metoprolol succinate ER 50 daily  simvastatin 10 at bedtime    Vital Signs Last 24 Hrs  T(F): 98 (09-23-19 @ 14:55), Max: 98.3 (09-22-19 @ 21:08)  HR: 88 (09-23-19 @ 14:55)  BP: 106/68 (09-23-19 @ 14:55)  RR: 17 (09-23-19 @ 14:55)  SpO2: 97% (09-23-19 @ 14:55) (96% - 98%)    PHYSICAL EXAM:  General: non-toxic, ambulating  HEAD/EYES: anicteric  ENT:  supple  Cardiovascular:   S1, S2  Respiratory:  clear bilaterally  GI:  soft, non-tender, normal bowel sounds  :  no mckeon  Musculoskeletal:  no synovitis  Neurologic:  decreased sensation b/l feet  Skin:  dressing c/d/i  Psychiatric:  appropriate affect  Vascular:  no phlebitis    Hemoglobin A1C, Whole Blood: 9.3:  % (09.18.19 @ 05:39)    Sedimentation Rate, Erythrocyte: 51 (09-17-19)  C-Reactive Protein, Serum: 28.6 (09-17-19)                                 14.5   14.93 )-----------( 313      ( 23 Sep 2019 06:40 )             43.9 09-22    135  |  98  |  15  ----------------------------<  190  4.1   |  20  |  0.83  Ca    9.5      22 Sep 2019 07:30Phos  2.9     09-22Mg     2.0     09-22  TPro  7.6  /  Alb  3.6  /  TBili  0.5  /  DBili  x   /  AST  12  /  ALT  11  /  AlkPhos  81  09-22    MICROBIOLOGY:  Culture - Abscess with Gram Stain (collected 17 Sep 2019 16:37)  Source: OTHER  Preliminary Report (19 Sep 2019 12:40):    MODERATE    KLP^Klebsiella pneumoniae (only amp-R)    STAU^Staphylococcus aureus (MSSA)    STCN^Staphylococcus sp.,coag neg    RADIOLOGY:  imaging below personally reviewed    MR Foot w/wo IV Cont, Left (09.18.19 @ 17:29)   IMPRESSION:  Lateral forefoot soft tissue ulceration over 5th MTP region with underlying enhancing marrow signal abnormalities suspicious for osteomyelitis involving 5th metatarsal head and proximal phalanx. No discrete drainable abscess collections.  Nonspecific plantar myopathy.  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

## 2019-09-23 NOTE — PROGRESS NOTE ADULT - ASSESSMENT
63M with CAD, HTN, long-standing diabetes that is poorly controlled (A1C=9.8) here with foot ulcer c/b infection and osteomyelitis.  Superficial culture is polymicrobial with kpna, MSSA, CoNS.  s/p angiogram and angioplasty.  No plans for further surgical intervention or management.  Plan for 6 weeks IV antibiotics    Suggest:  - transition to ceftriaxone 2 g daily through 10/28  - picc or midline   - weekly - cbc, bun/creatinine only, esr - fax results to [phone]  - d/c planning    Please call Infectious Diseases if there is a change in status.  Thank you.  [phone].      above d/w medicine  [id number]

## 2019-09-23 NOTE — PROGRESS NOTE ADULT - PROBLEM SELECTOR PLAN 2
- Presented w/ulcer on LLE x 3 weeks, poorly healing ulcer  - Venous duplex indicative of LLE arterial disease  - S/p angiogram with angioplasty  - Wound Cx - Klebsiella, S. aureus, Coag negative Staph  - Podiatry following, no plan for surgical intervention  - C/w zosyn, ID following  - F/u w/ID regarding PICC placement vs oral Abx - Presented w/ulcer on LLE x 3 weeks, poorly healing ulcer  - Venous duplex indicative of LLE arterial disease  - S/p angiogram with angioplasty  - Wound Cx - Klebsiella, S. aureus, Coag negative Staph  - Podiatry following, no plan for surgical intervention, to follow-up with outpatient podiatry  - Per ID, to be transitioned to ceftriaxone  - PICC line placement today versus tomorrow

## 2019-09-23 NOTE — PROGRESS NOTE ADULT - SUBJECTIVE AND OBJECTIVE BOX
Vascular Surgery Progress Note    SUBJECTIVE: Pt seen and examined at bedside. Patient comfortable and in no-apparent distress. Patient does not have pain in foot and has been walking. He believes he will get a PICC today so he can be discharged home later.       Vital Signs Last 24 Hrs  T(C): 36.6 (23 Sep 2019 06:06), Max: 36.8 (22 Sep 2019 21:08)  T(F): 97.8 (23 Sep 2019 06:06), Max: 98.3 (22 Sep 2019 21:08)  HR: 83 (23 Sep 2019 06:06) (83 - 93)  BP: 116/73 (23 Sep 2019 06:06) (112/71 - 121/64)  BP(mean): --  RR: 17 (23 Sep 2019 06:06) (16 - 18)  SpO2: 96% (23 Sep 2019 06:06) (96% - 98%)    Physical Exam:  General Appearance: Appears well, NAD  Respiratory: No labored breathing  CV: Pulse regularly present  Abdomen: Soft, nontense  Vascular: Right groin soft without swelling. LLE palpable AT, dressing CDI.     LABS:                        14.5   14.93 )-----------( 313      ( 23 Sep 2019 06:40 )             43.9     09-22    135  |  98  |  15  ----------------------------<  190<H>  4.1   |  20<L>  |  0.83    Ca    9.5      22 Sep 2019 07:30  Phos  2.9     09-22  Mg     2.0     09-22    TPro  7.6  /  Alb  3.6  /  TBili  0.5  /  DBili  x   /  AST  12  /  ALT  11  /  AlkPhos  81  09-22          INs and OUTs:

## 2019-09-23 NOTE — PROGRESS NOTE ADULT - PROBLEM SELECTOR PLAN 1
- MRI 9/18 suggestive of OM  - Wound Cx significant for Klebsiella, S. aureus, Coag negative Staph  - C/w zosyn, ID following  - F/u w/ID regarding PICC placement vs oral Abx - MRI 9/18 suggestive of OM  - Wound Cx significant for Klebsiella, S. aureus, Coag negative Staph  - Per ID, patient to be transitioned to ceftriaxone for treatment through 10/28/19  - PICC line placement today versus tomorrow

## 2019-09-23 NOTE — PROGRESS NOTE ADULT - PROBLEM SELECTOR PLAN 4
- History of diabetes, uses insulin at home  - HgbA1c 9.3  - -220  - C/w ISS  - Monitor glucose levels - History of diabetes, uses insulin at home  - HgbA1c 9.3  - -263  - Monitor glucose levels

## 2019-09-23 NOTE — CHART NOTE - NSCHARTNOTEFT_GEN_A_CORE
Pre-Interventional Radiology Procedure Note    63y    Male    Procedure: PICC     Diagnosis/Indication: Patient is a 63y old  Male who presents with a chief complaint of Left foot ulcer (23 Sep 2019 11:29)      Interventional Radiology Attending Physician:     Ordering Attending Physician: Dr. De La Torre    PAST MEDICAL & SURGICAL HISTORY:  Stented Coronary Artery  COPD (Chronic Obstructive Pulmonary Disease)  Old myocardial infarction  Diabetes Mellitus Type II  Hypertension  H/O: Varicose Veins: laser surgery  Knee Arthroplasty  Ankle Fracture: left repaired       CBC Full  -  ( 23 Sep 2019 06:40 )  WBC Count : 14.93 K/uL  RBC Count : 5.14 M/uL  Hemoglobin : 14.5 g/dL  Hematocrit : 43.9 %  Platelet Count - Automated : 313 K/uL  Mean Cell Volume : 85.4 fL  Mean Cell Hemoglobin : 28.2 pg  Mean Cell Hemoglobin Concentration : 33.0 %  Auto Neutrophil # : x  Auto Lymphocyte # : x  Auto Monocyte # : x  Auto Eosinophil # : x  Auto Basophil # : x  Auto Neutrophil % : x  Auto Lymphocyte % : x  Auto Monocyte % : x  Auto Eosinophil % : x  Auto Basophil % : x    09-22    135  |  98  |  15  ----------------------------<  190<H>  4.1   |  20<L>  |  0.83    Ca    9.5      22 Sep 2019 07:30  Phos  2.9     09-22  Mg     2.0     09-22    TPro  7.6  /  Alb  3.6  /  TBili  0.5  /  DBili  x   /  AST  12  /  ALT  11  /  AlkPhos  81  09-22

## 2019-09-23 NOTE — PROGRESS NOTE ADULT - PROBLEM SELECTOR PLAN 5
- Blood pressure 108-122/64-78  - Monitor blood pressure - Blood pressure 112-121/64-73  - Monitor blood pressure

## 2019-09-23 NOTE — PROGRESS NOTE ADULT - SUBJECTIVE AND OBJECTIVE BOX
Patient is a 63y old  Male who presents with a chief complaint of Left foot ulcer (22 Sep 2019 07:30)      SUBJECTIVE / OVERNIGHT EVENTS:    MEDICATIONS  (STANDING):  aspirin enteric coated 81 milliGRAM(s) Oral daily  clopidogrel Tablet 75 milliGRAM(s) Oral daily  collagenase Ointment 1 Application(s) Topical daily  dextrose 5%. 1000 milliLiter(s) (50 mL/Hr) IV Continuous <Continuous>  dextrose 50% Injectable 12.5 Gram(s) IV Push once  dextrose 50% Injectable 25 Gram(s) IV Push once  dextrose 50% Injectable 25 Gram(s) IV Push once  enoxaparin Injectable 40 milliGRAM(s) SubCutaneous daily  escitalopram 20 milliGRAM(s) Oral daily  influenza   Vaccine 0.5 milliLiter(s) IntraMuscular once  insulin lispro (HumaLOG) corrective regimen sliding scale   SubCutaneous three times a day before meals  insulin lispro (HumaLOG) corrective regimen sliding scale   SubCutaneous at bedtime  lidocaine 1% Injectable 20 milliLiter(s) Local Injection Once  lisinopril 5 milliGRAM(s) Oral daily  metoprolol succinate ER 50 milliGRAM(s) Oral daily  piperacillin/tazobactam IVPB.. 3.375 Gram(s) IV Intermittent every 8 hours  simvastatin 10 milliGRAM(s) Oral at bedtime    MEDICATIONS  (PRN):  dextrose 40% Gel 15 Gram(s) Oral once PRN Blood Glucose LESS THAN 70 milliGRAM(s)/deciliter  glucagon  Injectable 1 milliGRAM(s) IntraMuscular once PRN Glucose LESS THAN 70 milligrams/deciliter  nicotine - Inhaler 1 Each Inhalation every 6 hours PRN nicotine withdrawal      Vital Signs Last 24 Hrs  T(C): 36.6 (23 Sep 2019 06:06), Max: 36.8 (22 Sep 2019 21:08)  T(F): 97.8 (23 Sep 2019 06:06), Max: 98.3 (22 Sep 2019 21:08)  HR: 83 (23 Sep 2019 06:06) (83 - 93)  BP: 116/73 (23 Sep 2019 06:06) (112/71 - 121/64)  BP(mean): --  RR: 17 (23 Sep 2019 06:06) (16 - 18)  SpO2: 96% (23 Sep 2019 06:06) (96% - 98%)  CAPILLARY BLOOD GLUCOSE        I&O's Summary      PHYSICAL EXAM:  Vital Signs Last 24 Hrs  T(C): 36.6 (09-23-19 @ 06:06)  T(F): 97.8 (09-23-19 @ 06:06), Max: 98.3 (09-22-19 @ 21:08)  HR: 83 (09-23-19 @ 06:06) (83 - 93)  BP: 116/73 (09-23-19 @ 06:06)  BP(mean): --  RR: 17 (09-23-19 @ 06:06) (16 - 18)  SpO2: 96% (09-23-19 @ 06:06) (96% - 98%)  Wt(kg): --    Constitutional: NAD, awake and alert  EYES: EOMI  ENT:  Normal Hearing, no tonsillar exudates   Neck: Soft and supple , no thyromegaly   Respiratory: Breath sounds are clear bilaterally, No wheezing, rales or rhonchi  Cardiovascular: S1 and S2, regular rate and rhythm, no Murmurs, gallops or rubs, no JVD,    Gastrointestinal: Bowel Sounds present, soft, nontender, nondistended, no guarding, no rebound  Extremities: No cyanosis or clubbing; warm to touch  Vascular: 2+ peripheral pulses lower ex  Neurological: No focal deficits, CN II-XII intact bilaterally, sensation to light touch intact in all extremities, gait intact. Pupils are equally reactive to light and symmetrical in size.   Musculoskeletal: 5/5 strength b/l upper and lower extremities; no joint swelling.  Skin: No rashes  Psych: no depression or anhedonia, AAOx3  HEME: no bruises, no nose bleeds      LABS:                        15.6   15.58 )-----------( 315      ( 22 Sep 2019 07:30 )             48.6     09-22    135  |  98  |  15  ----------------------------<  190<H>  4.1   |  20<L>  |  0.83    Ca    9.5      22 Sep 2019 07:30  Phos  2.9     09-22  Mg     2.0     09-22    TPro  7.6  /  Alb  3.6  /  TBili  0.5  /  DBili  x   /  AST  12  /  ALT  11  /  AlkPhos  81  09-22              RADIOLOGY & ADDITIONAL TESTS:    Imaging Personally Reviewed:    Consultant(s) Notes Reviewed:      Care Discussed with Consultants/Other Providers: Patient is a 63y old  Male who presents with a chief complaint of Left foot ulcer (22 Sep 2019 07:30)      SUBJECTIVE / OVERNIGHT EVENTS: Patient evaluated at bedside. TWYLA. Patient c/o residual b/l LE numbness, resolving R groin pain. Denies fever, N/V, lightheadedness, dizziness, diarrhea, constipation, SOB. No new complaints or concerns as per patient.    CONSTITUTIONAL: No weakness, fevers or chills  EYES/ENT: No visual changes;  No vertigo or throat pain   NECK: No pain or stiffness  RESPIRATORY: No cough, wheezing, hemoptysis; No shortness of breath  CARDIOVASCULAR: No chest pain or palpitations  GASTROINTESTINAL: No abdominal or epigastric pain. No nausea, vomiting, or hematemesis; No diarrhea or constipation. No melena or hematochezia.  GENITOURINARY: No dysuria, frequency or hematuria  NEUROLOGICAL: +R groin pain. B/l LE numbness. No weakness.  SKIN: No itching, burning, rashes, or lesions   All other review of systems is negative unless indicated above.     MEDICATIONS  (STANDING):  aspirin enteric coated 81 milliGRAM(s) Oral daily  clopidogrel Tablet 75 milliGRAM(s) Oral daily  collagenase Ointment 1 Application(s) Topical daily  dextrose 5%. 1000 milliLiter(s) (50 mL/Hr) IV Continuous <Continuous>  dextrose 50% Injectable 12.5 Gram(s) IV Push once  dextrose 50% Injectable 25 Gram(s) IV Push once  dextrose 50% Injectable 25 Gram(s) IV Push once  enoxaparin Injectable 40 milliGRAM(s) SubCutaneous daily  escitalopram 20 milliGRAM(s) Oral daily  influenza   Vaccine 0.5 milliLiter(s) IntraMuscular once  insulin lispro (HumaLOG) corrective regimen sliding scale   SubCutaneous three times a day before meals  insulin lispro (HumaLOG) corrective regimen sliding scale   SubCutaneous at bedtime  lidocaine 1% Injectable 20 milliLiter(s) Local Injection Once  lisinopril 5 milliGRAM(s) Oral daily  metoprolol succinate ER 50 milliGRAM(s) Oral daily  piperacillin/tazobactam IVPB.. 3.375 Gram(s) IV Intermittent every 8 hours  simvastatin 10 milliGRAM(s) Oral at bedtime    MEDICATIONS  (PRN):  dextrose 40% Gel 15 Gram(s) Oral once PRN Blood Glucose LESS THAN 70 milliGRAM(s)/deciliter  glucagon  Injectable 1 milliGRAM(s) IntraMuscular once PRN Glucose LESS THAN 70 milligrams/deciliter  nicotine - Inhaler 1 Each Inhalation every 6 hours PRN nicotine withdrawal      Vital Signs Last 24 Hrs  T(C): 36.6 (23 Sep 2019 06:06), Max: 36.8 (22 Sep 2019 21:08)  T(F): 97.8 (23 Sep 2019 06:06), Max: 98.3 (22 Sep 2019 21:08)  HR: 83 (23 Sep 2019 06:06) (83 - 93)  BP: 116/73 (23 Sep 2019 06:06) (112/71 - 121/64)  BP(mean): --  RR: 17 (23 Sep 2019 06:06) (16 - 18)  SpO2: 96% (23 Sep 2019 06:06) (96% - 98%)  CAPILLARY BLOOD GLUCOSE        I&O's Summary      PHYSICAL EXAM:  Vital Signs Last 24 Hrs  T(C): 36.6 (09-23-19 @ 06:06)  T(F): 97.8 (09-23-19 @ 06:06), Max: 98.3 (09-22-19 @ 21:08)  HR: 83 (09-23-19 @ 06:06) (83 - 93)  BP: 116/73 (09-23-19 @ 06:06)  BP(mean): --  RR: 17 (09-23-19 @ 06:06) (16 - 18)  SpO2: 96% (09-23-19 @ 06:06) (96% - 98%)  Wt(kg): --    Constitutional: NAD, awake and alert  EYES: +PERRL, +EOMI, no scleral icterus  ENT: Moist oral mucosa  Respiratory: CTAB; no wheezing, rales or rhonchi  Cardiovascular: +S1/S2; RRR; no murmurs, gallops or rubs    Gastrointestinal: Soft, nontender, nondistended; +BS  Genitourinary: Tenderness in R groin (procedure site); no hematoma, no swelling or fluctuance, no erythema present. 1+ R femoral pulse present  Extremities: No cyanosis or clubbing; warm to touch  Vascular: 2+ peripheral pulses x b/l LE  Neurological: Slightly decreased sensation in b/l LE; CN II-XII intact bilaterally. A&Ox3.    Musculoskeletal: 5/5 strength b/l upper and lower extremities; no joint swelling. Nontender LLE.  Skin: Closed ulcer on L foot with very mild erythema; no swelling, purulent discharge, or tenderness.      LABS:                        15.6   15.58 )-----------( 315      ( 22 Sep 2019 07:30 )             48.6     09-22    135  |  98  |  15  ----------------------------<  190<H>  4.1   |  20<L>  |  0.83    Ca    9.5      22 Sep 2019 07:30  Phos  2.9     09-22  Mg     2.0     09-22    TPro  7.6  /  Alb  3.6  /  TBili  0.5  /  DBili  x   /  AST  12  /  ALT  11  /  AlkPhos  81  09-22              RADIOLOGY & ADDITIONAL TESTS:    Imaging Personally Reviewed:    Consultant(s) Notes Reviewed:      Care Discussed with Consultants/Other Providers:

## 2019-09-24 ENCOUNTER — TRANSCRIPTION ENCOUNTER (OUTPATIENT)
Age: 64
End: 2019-09-24

## 2019-09-24 VITALS — WEIGHT: 160.28 LBS

## 2019-09-24 DIAGNOSIS — Z29.9 ENCOUNTER FOR PROPHYLACTIC MEASURES, UNSPECIFIED: ICD-10-CM

## 2019-09-24 PROCEDURE — 36573 INSJ PICC RS&I 5 YR+: CPT

## 2019-09-24 PROCEDURE — 99239 HOSP IP/OBS DSCHRG MGMT >30: CPT

## 2019-09-24 RX ORDER — METOPROLOL TARTRATE 50 MG
1 TABLET ORAL
Qty: 0 | Refills: 0 | DISCHARGE

## 2019-09-24 RX ORDER — DAPAGLIFLOZIN 10 MG/1
1 TABLET, FILM COATED ORAL
Qty: 0 | Refills: 0 | DISCHARGE

## 2019-09-24 RX ORDER — CEFTRIAXONE 500 MG/1
2 INJECTION, POWDER, FOR SOLUTION INTRAMUSCULAR; INTRAVENOUS
Qty: 1 | Refills: 0
Start: 2019-09-24

## 2019-09-24 RX ORDER — LINAGLIPTIN AND METFORMIN HYDROCHLORIDE 2.5; 85 MG/1; MG/1
1 TABLET, FILM COATED ORAL
Qty: 60 | Refills: 0
Start: 2019-09-24 | End: 2019-10-23

## 2019-09-24 RX ORDER — AMOXICILLIN 250 MG/5ML
1 SUSPENSION, RECONSTITUTED, ORAL (ML) ORAL
Qty: 0 | Refills: 0 | DISCHARGE

## 2019-09-24 RX ORDER — ESCITALOPRAM OXALATE 10 MG/1
1 TABLET, FILM COATED ORAL
Qty: 0 | Refills: 0 | DISCHARGE

## 2019-09-24 RX ORDER — CLOPIDOGREL BISULFATE 75 MG/1
1 TABLET, FILM COATED ORAL
Qty: 0 | Refills: 0 | DISCHARGE
Start: 2019-09-24

## 2019-09-24 RX ORDER — CEFTRIAXONE 500 MG/1
2000 INJECTION, POWDER, FOR SOLUTION INTRAMUSCULAR; INTRAVENOUS ONCE
Refills: 0 | Status: COMPLETED | OUTPATIENT
Start: 2019-09-24 | End: 2019-09-24

## 2019-09-24 RX ORDER — ASPIRIN/CALCIUM CARB/MAGNESIUM 324 MG
1 TABLET ORAL
Qty: 0 | Refills: 0 | DISCHARGE

## 2019-09-24 RX ORDER — LISINOPRIL 2.5 MG/1
1 TABLET ORAL
Qty: 0 | Refills: 0 | DISCHARGE

## 2019-09-24 RX ORDER — INSULIN GLARGINE 100 [IU]/ML
40 INJECTION, SOLUTION SUBCUTANEOUS
Qty: 0 | Refills: 0 | DISCHARGE

## 2019-09-24 RX ORDER — CEFTRIAXONE 500 MG/1
2 INJECTION, POWDER, FOR SOLUTION INTRAMUSCULAR; INTRAVENOUS
Qty: 0 | Refills: 0 | DISCHARGE
Start: 2019-09-24

## 2019-09-24 RX ORDER — ASPIRIN/CALCIUM CARB/MAGNESIUM 324 MG
1 TABLET ORAL
Qty: 0 | Refills: 0 | DISCHARGE
Start: 2019-09-24

## 2019-09-24 RX ORDER — LINAGLIPTIN AND METFORMIN HYDROCHLORIDE 2.5; 85 MG/1; MG/1
1 TABLET, FILM COATED ORAL
Qty: 30 | Refills: 0
Start: 2019-09-24 | End: 2019-10-23

## 2019-09-24 RX ORDER — LINAGLIPTIN AND METFORMIN HYDROCHLORIDE 2.5; 85 MG/1; MG/1
1 TABLET, FILM COATED ORAL
Qty: 0 | Refills: 0 | DISCHARGE

## 2019-09-24 RX ORDER — CYCLOBENZAPRINE HYDROCHLORIDE 10 MG/1
1 TABLET, FILM COATED ORAL
Qty: 0 | Refills: 0 | DISCHARGE

## 2019-09-24 RX ADMIN — CLOPIDOGREL BISULFATE 75 MILLIGRAM(S): 75 TABLET, FILM COATED ORAL at 11:08

## 2019-09-24 RX ADMIN — LISINOPRIL 5 MILLIGRAM(S): 2.5 TABLET ORAL at 06:26

## 2019-09-24 RX ADMIN — ESCITALOPRAM OXALATE 20 MILLIGRAM(S): 10 TABLET, FILM COATED ORAL at 11:04

## 2019-09-24 RX ADMIN — Medication 1 APPLICATION(S): at 11:05

## 2019-09-24 RX ADMIN — Medication 50 MILLIGRAM(S): at 06:26

## 2019-09-24 RX ADMIN — Medication 81 MILLIGRAM(S): at 11:04

## 2019-09-24 RX ADMIN — ENOXAPARIN SODIUM 40 MILLIGRAM(S): 100 INJECTION SUBCUTANEOUS at 11:05

## 2019-09-24 RX ADMIN — CEFTRIAXONE 100 MILLIGRAM(S): 500 INJECTION, POWDER, FOR SOLUTION INTRAMUSCULAR; INTRAVENOUS at 11:57

## 2019-09-24 NOTE — DIETITIAN INITIAL EVALUATION ADULT. - PERTINENT MEDS FT
aspirin enteric coated  clopidogrel Tablet  collagenase Ointment  enoxaparin Injectable  escitalopram  glucagon  Injectable PRN  influenza   Vaccine  insulin lispro (HumaLOG) corrective regimen sliding scale  lidocaine 1% Injectable  lisinopril  metoprolol succinate ER  nicotine - Inhaler PRN  simvastatin

## 2019-09-24 NOTE — PROGRESS NOTE ADULT - PROVIDER SPECIALTY LIST ADULT
Infectious Disease
Infectious Disease
Internal Medicine
Podiatry
Vascular Cardiology
Vascular Surgery
Internal Medicine

## 2019-09-24 NOTE — PROGRESS NOTE ADULT - PROBLEM SELECTOR PLAN 4
- History of diabetes, uses insulin at home  - HgbA1c 9.3  - -263  - Monitor glucose levels - History of diabetes, uses insulin at home  - HgbA1c 9.3  - -263  - On home insulin, received prescription to continue home insulin  - Advised to follow-up with Endocrinology outpatient for better glycemic control

## 2019-09-24 NOTE — DIETITIAN INITIAL EVALUATION ADULT. - PHYSICAL APPEARANCE
overweight/other (specify) No pressure ulcers/DTI noted in flowsheets.   Edema: 1+ left ankle per nursing documentation

## 2019-09-24 NOTE — PROGRESS NOTE ADULT - ASSESSMENT
64 yo M pt w/ left foot lateral 5th met head wound  - pt seen and evaluated  - left foot lateral forefoot wound, ischemic changes noted to lateral border ulcer with erythema resolved,  no tracking, no malodor  - MRI results read demonstrating enhancing marrow signal abnormalities suspicious for OM of 5th met head and proximal phalanx  - s/p  SFA + AT angioplasty  -  WCx growing CNS, Klebsiella Pneumoniae, MSSA.  - ID rec ceftriaxone 2 g daily through 10/28, PICC to be placed today\  - Pod stable for discharge after PICC  - continue with local wound care with collagenase   - Discussed w/ attending

## 2019-09-24 NOTE — DISCHARGE NOTE NURSING/CASE MANAGEMENT/SOCIAL WORK - NSDCPNINST_GEN_ALL_CORE
Mr Jones is alert, independent, he is out of bed with his cane for support.  Since Hospitalized  he had the debridement done and the Wound does get Collagenase to the Base and 4x4 gauze and wrapped with kerlis .  He has the Surgical Boot in use.  All Vital Signs are stable and Instructions are given to him along with Supplies for dressing Changes..  He did have the PICC Line placed and he will and did receive his Ceftriaxone IV Antibiotic prior to being discharged Home.  Educational Material on the Care of the PICC Line and the Ceftriaxone are given and reviewed with him.

## 2019-09-24 NOTE — DISCHARGE NOTE NURSING/CASE MANAGEMENT/SOCIAL WORK - NSDCFUADDAPPT_GEN_ALL_CORE_FT
You are advised to follow-up with the following specialties:    Carline Peacock (ANNELISE)  Podiatric Medicine and Surgery  [address]  [place] [address]  Phone: [phone]    [facility] Endocrinology  Endocrinology  [address] [place] [address]  Phone: [phone]    Svetlana Bar)  Surgery  [address] [place] [address]  Phone: [phone]  Fax: [phone]  Follow Up Time: 2 weeks    Ale Hauser)  Infectious Disease; Internal Medicine  [address] [place] [id number]  Phone: [phone]  Fax: [phone]  Follow Up Time: 2 weeks    Weekly cbc, bun/creatinine only, esr - fax results to [phone]

## 2019-09-24 NOTE — PROGRESS NOTE ADULT - ASSESSMENT
63 year old male with history of HTN, T2DM (on insulin), CAD s/p stents was sent in by podiatrist for a left foot ulcer, admitted for further management. Poorly healing LLE ulcer in the setting of LLE arterial disease, s/p angiogram with angioplasty. Undergoing treatment for OM. 63 year old male with history of HTN, T2DM (on insulin), CAD s/p stents was sent in by podiatrist for a left foot ulcer, admitted for further management. Poorly healing LLE ulcer in the setting of LLE arterial disease, s/p angiogram with angioplasty. Undergoing treatment for OM, PICC line placed, tentative discharge today.

## 2019-09-24 NOTE — PROGRESS NOTE ADULT - REASON FOR ADMISSION
Left foot ulcer

## 2019-09-24 NOTE — DIETITIAN INITIAL EVALUATION ADULT. - RD TO REMAIN AVAILABLE
1. Continue Consistent Carbohydrate, DASH/TLC (cholesterol and sodium restricted) diet. 2. Suggest outpatient follow up with an endocrinologist to ensure long-term DM diet comprehension and compliance. 3. Diet education regarding Heart Healthy, Consistent Carbohydrate diet.

## 2019-09-24 NOTE — PROGRESS NOTE ADULT - PROBLEM SELECTOR PLAN 1
- MRI 9/18 suggestive of OM  - Wound Cx significant for Klebsiella, S. aureus, Coag negative Staph  - Per ID, patient to be transitioned to ceftriaxone for treatment through 10/28/19  - PICC line placement today versus tomorrow - MRI 9/18 suggestive of OM  - Wound Cx significant for Klebsiella, S. aureus, Coag negative Staph  - Per ID, patient to be transitioned to ceftriaxone for treatment through 10/28/19  - PICC line placed today, to be discharged afterwards

## 2019-09-24 NOTE — PROGRESS NOTE ADULT - PROBLEM SELECTOR PLAN 2
- Presented w/ulcer on LLE x 3 weeks, poorly healing ulcer  - Venous duplex indicative of LLE arterial disease  - S/p angiogram with angioplasty  - Wound Cx - Klebsiella, S. aureus, Coag negative Staph  - Podiatry following, no plan for surgical intervention, to follow-up with outpatient podiatry  - Per ID, to be transitioned to ceftriaxone  - PICC line placement today versus tomorrow - Presented w/ulcer on LLE x 3 weeks, poorly healing ulcer  - Venous duplex indicative of LLE arterial disease  - S/p angiogram with angioplasty  - Wound Cx - Klebsiella, S. aureus, Coag negative Staph  - Podiatry following, no plan for surgical intervention, to follow-up with outpatient podiatry  - Per ID, to be transitioned to ceftriaxone, to follow-up with ID as outpatient  - PICC line placement today versus tomorrow

## 2019-09-24 NOTE — PROGRESS NOTE ADULT - SUBJECTIVE AND OBJECTIVE BOX
Podiatry pager #: [place] Pager:  [phone], [facility] Pager: [phone]    Patient is a 63y old  Male who presents with a chief complaint of Left foot ulcer (23 Sep 2019 15:06)       INTERVAL HPI/OVERNIGHT EVENTS:  Patient seen and evaluated at bedside.  Pt is resting comfortable in NAD. Denies N/V/F/C.      Allergies    No Known Allergies    Intolerances        Vital Signs Last 24 Hrs  T(C): 36.5 (24 Sep 2019 06:22), Max: 36.7 (23 Sep 2019 14:55)  T(F): 97.7 (24 Sep 2019 06:22), Max: 98 (23 Sep 2019 14:55)  HR: 87 (24 Sep 2019 06:22) (87 - 88)  BP: 113/73 (24 Sep 2019 06:22) (106/68 - 113/73)  BP(mean): --  RR: 17 (24 Sep 2019 06:22) (17 - 17)  SpO2: 98% (24 Sep 2019 06:22) (97% - 98%)    LABS:                        14.5   14.93 )-----------( 313      ( 23 Sep 2019 06:40 )             43.9               CAPILLARY BLOOD GLUCOSE      POCT Blood Glucose.: 263 mg/dL (23 Sep 2019 13:28)  POCT Blood Glucose.: 211 mg/dL (23 Sep 2019 08:44)      Lower Extremity Physical Exam:    Vascular: DP/PT 1/4 right; non palpable left, CFT <3 seconds B/L, Temperature gradient warm to warm left, warm to cold right  Neuro: Epicritic sensation present to the level of toes B/L  Wound #1:   Location: left foot lateral 5th met head  Size: 3 x 2 cm   Depth: down to subQ  Wound bed: necrotic/ischemic  Drainage: none  Odor: None  Periwound: macerated  Etiology: friction    RADIOLOGY & ADDITIONAL TESTS:  < from: MR Foot w/wo IV Cont, Left (09.18.19 @ 17:29) >    EXAM:  MR FOOT WAW IC LT        PROCEDURE DATE:  Sep 18 2019         INTERPRETATION:  CLINICAL INDICATION: left ankle/foot infection; evaluate   for underlying abscess and/or osteomyelitis    TECHNIQUE:  Multiplanar and multisequence left foot MRI performed before and after   administration of 10 cc of Gadavist IV without reported complications and   without discard. No prior MRI studies available for comparison. Reviewed   in conjunction with left ankle and foot radiographs from previous day.    FINDINGS:  Focal small lateral forefoot soft tissue ulceration over 5th MTP region.   No discrete circumscribed rim-enhancing uniform fluid signal intensity   collections seen in the region or elsewhere to indicate a discrete   drainable abscess.    Enhancing patchy marrow signal abnormality in the 5th metatarsal head and   5th proximal phalanx subjacent to the ulceration suspicious for   osteomyelitis. No additional enhancing marrow signal abnormalities to   suspect an additional areas of osteomyelitis.    Redemonstrated old healed posttraumatic distal tibiofibular deformity   with tibiotalar arthritic change. No dislocations or acute appearing   fractures.    Flexor and extensor tendons are normal in course caliber and signal.    Generalized increased T2 signal with mild postcontrast enhancement   involving the plantar musculature however without swelling/enlargement   reflect a nonspecific myopathy    No discrete osseous or soft tissue mass lesions.    IMPRESSION:  Lateral forefoot soft tissue ulceration over 5th MTP region with   underlying enhancing marrow signal abnormalities suspicious for   osteomyelitis involving 5th metatarsal head and proximal phalanx. No   discrete drainable abscess collections.    Nonspecific plantar myopathy.    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                  SHAN HAND M.D., ATTENDING RADIOLOGIST  This document has been electronically signed. Sep 18 2019  5:40PM                  < end of copied text >

## 2019-09-24 NOTE — PROGRESS NOTE ADULT - SUBJECTIVE AND OBJECTIVE BOX
Patient is a 63y old  Male who presents with a chief complaint of Left foot ulcer (24 Sep 2019 08:36)      SUBJECTIVE / OVERNIGHT EVENTS:    MEDICATIONS  (STANDING):  aspirin enteric coated 81 milliGRAM(s) Oral daily  clopidogrel Tablet 75 milliGRAM(s) Oral daily  collagenase Ointment 1 Application(s) Topical daily  dextrose 5%. 1000 milliLiter(s) (50 mL/Hr) IV Continuous <Continuous>  dextrose 50% Injectable 12.5 Gram(s) IV Push once  dextrose 50% Injectable 25 Gram(s) IV Push once  dextrose 50% Injectable 25 Gram(s) IV Push once  enoxaparin Injectable 40 milliGRAM(s) SubCutaneous daily  escitalopram 20 milliGRAM(s) Oral daily  influenza   Vaccine 0.5 milliLiter(s) IntraMuscular once  insulin lispro (HumaLOG) corrective regimen sliding scale   SubCutaneous three times a day before meals  insulin lispro (HumaLOG) corrective regimen sliding scale   SubCutaneous at bedtime  lidocaine 1% Injectable 20 milliLiter(s) Local Injection Once  lisinopril 5 milliGRAM(s) Oral daily  metoprolol succinate ER 50 milliGRAM(s) Oral daily  piperacillin/tazobactam IVPB.. 3.375 Gram(s) IV Intermittent every 8 hours  simvastatin 10 milliGRAM(s) Oral at bedtime    MEDICATIONS  (PRN):  dextrose 40% Gel 15 Gram(s) Oral once PRN Blood Glucose LESS THAN 70 milliGRAM(s)/deciliter  glucagon  Injectable 1 milliGRAM(s) IntraMuscular once PRN Glucose LESS THAN 70 milligrams/deciliter  nicotine - Inhaler 1 Each Inhalation every 6 hours PRN nicotine withdrawal      Vital Signs Last 24 Hrs  T(C): 36.5 (24 Sep 2019 06:22), Max: 36.7 (23 Sep 2019 14:55)  T(F): 97.7 (24 Sep 2019 06:22), Max: 98 (23 Sep 2019 14:55)  HR: 87 (24 Sep 2019 06:22) (87 - 88)  BP: 113/73 (24 Sep 2019 06:22) (106/68 - 113/73)  BP(mean): --  RR: 17 (24 Sep 2019 06:22) (17 - 17)  SpO2: 98% (24 Sep 2019 06:22) (97% - 98%)  CAPILLARY BLOOD GLUCOSE      POCT Blood Glucose.: 263 mg/dL (23 Sep 2019 13:28)    I&O's Summary      PHYSICAL EXAM:  Vital Signs Last 24 Hrs  T(C): 36.5 (09-24-19 @ 06:22)  T(F): 97.7 (09-24-19 @ 06:22), Max: 98 (09-23-19 @ 14:55)  HR: 87 (09-24-19 @ 06:22) (87 - 88)  BP: 113/73 (09-24-19 @ 06:22)  BP(mean): --  RR: 17 (09-24-19 @ 06:22) (17 - 17)  SpO2: 98% (09-24-19 @ 06:22) (97% - 98%)  Wt(kg): --    Constitutional: NAD, awake and alert  EYES: EOMI  ENT:  Normal Hearing, no tonsillar exudates   Neck: Soft and supple , no thyromegaly   Respiratory: Breath sounds are clear bilaterally, No wheezing, rales or rhonchi  Cardiovascular: S1 and S2, regular rate and rhythm, no Murmurs, gallops or rubs, no JVD,    Gastrointestinal: Bowel Sounds present, soft, nontender, nondistended, no guarding, no rebound  Extremities: No cyanosis or clubbing; warm to touch  Vascular: 2+ peripheral pulses lower ex  Neurological: No focal deficits, CN II-XII intact bilaterally, sensation to light touch intact in all extremities, gait intact. Pupils are equally reactive to light and symmetrical in size.   Musculoskeletal: 5/5 strength b/l upper and lower extremities; no joint swelling.  Skin: No rashes  Psych: no depression or anhedonia, AAOx3  HEME: no bruises, no nose bleeds      LABS:                        14.5   14.93 )-----------( 313      ( 23 Sep 2019 06:40 )             43.9                     RADIOLOGY & ADDITIONAL TESTS:    Imaging Personally Reviewed:    Consultant(s) Notes Reviewed:      Care Discussed with Consultants/Other Providers: Patient is a 63y old  Male who presents with a chief complaint of Left foot ulcer (24 Sep 2019 08:36)      SUBJECTIVE / OVERNIGHT EVENTS: Patient evaluated at bedside. TWYLA. Patient c/o very slight LLE pain 2/2 ulcer and persistent b/l LE numbness. He was also inquiring about when he was to undergo his PICC line placement. Denies fever, N/V, lightheadedness, dizziness, constipation, diarrhea. No other complaints or concerns discussed during interview.    CONSTITUTIONAL: No weakness, fevers or chills  EYES/ENT: No visual changes;  No vertigo or throat pain   NECK: No pain or stiffness  RESPIRATORY: No cough, wheezing, hemoptysis; No shortness of breath  CARDIOVASCULAR: No chest pain or palpitations  GASTROINTESTINAL: No abdominal or epigastric pain. No nausea, vomiting, or hematemesis; No diarrhea or constipation. No melena or hematochezia.  GENITOURINARY: No dysuria, frequency or hematuria  NEUROLOGICAL: Slight LLE discomfort. B/l LE numbness. No weakness.  SKIN: No itching, burning, rashes, or lesions   All other review of systems is negative unless indicated above.    MEDICATIONS  (STANDING):  aspirin enteric coated 81 milliGRAM(s) Oral daily  clopidogrel Tablet 75 milliGRAM(s) Oral daily  collagenase Ointment 1 Application(s) Topical daily  dextrose 5%. 1000 milliLiter(s) (50 mL/Hr) IV Continuous <Continuous>  dextrose 50% Injectable 12.5 Gram(s) IV Push once  dextrose 50% Injectable 25 Gram(s) IV Push once  dextrose 50% Injectable 25 Gram(s) IV Push once  enoxaparin Injectable 40 milliGRAM(s) SubCutaneous daily  escitalopram 20 milliGRAM(s) Oral daily  influenza   Vaccine 0.5 milliLiter(s) IntraMuscular once  insulin lispro (HumaLOG) corrective regimen sliding scale   SubCutaneous three times a day before meals  insulin lispro (HumaLOG) corrective regimen sliding scale   SubCutaneous at bedtime  lidocaine 1% Injectable 20 milliLiter(s) Local Injection Once  lisinopril 5 milliGRAM(s) Oral daily  metoprolol succinate ER 50 milliGRAM(s) Oral daily  piperacillin/tazobactam IVPB.. 3.375 Gram(s) IV Intermittent every 8 hours  simvastatin 10 milliGRAM(s) Oral at bedtime    MEDICATIONS  (PRN):  dextrose 40% Gel 15 Gram(s) Oral once PRN Blood Glucose LESS THAN 70 milliGRAM(s)/deciliter  glucagon  Injectable 1 milliGRAM(s) IntraMuscular once PRN Glucose LESS THAN 70 milligrams/deciliter  nicotine - Inhaler 1 Each Inhalation every 6 hours PRN nicotine withdrawal      Vital Signs Last 24 Hrs  T(C): 36.5 (24 Sep 2019 06:22), Max: 36.7 (23 Sep 2019 14:55)  T(F): 97.7 (24 Sep 2019 06:22), Max: 98 (23 Sep 2019 14:55)  HR: 87 (24 Sep 2019 06:22) (87 - 88)  BP: 113/73 (24 Sep 2019 06:22) (106/68 - 113/73)  BP(mean): --  RR: 17 (24 Sep 2019 06:22) (17 - 17)  SpO2: 98% (24 Sep 2019 06:22) (97% - 98%)  CAPILLARY BLOOD GLUCOSE      POCT Blood Glucose.: 263 mg/dL (23 Sep 2019 13:28)    I&O's Summary      PHYSICAL EXAM:  Vital Signs Last 24 Hrs  T(C): 36.5 (09-24-19 @ 06:22)  T(F): 97.7 (09-24-19 @ 06:22), Max: 98 (09-23-19 @ 14:55)  HR: 87 (09-24-19 @ 06:22) (87 - 88)  BP: 113/73 (09-24-19 @ 06:22)  BP(mean): --  RR: 17 (09-24-19 @ 06:22) (17 - 17)  SpO2: 98% (09-24-19 @ 06:22) (97% - 98%)  Wt(kg): --    Constitutional: NAD, awake and alert  EYES: +PERRL, +EOMI, no scleral icterus  ENT: Moist oral mucosa  Respiratory: CTAB; no wheezing, rales or rhonchi  Cardiovascular: +S1/S2; RRR; no murmurs, gallops or rubs    Gastrointestinal: Soft, nontender, nondistended; +BS  Genitourinary: No tenderness in R groin (procedure site); no hematoma, no swelling or fluctuance, no erythema present. 1+ R femoral pulse present  Extremities: No cyanosis or clubbing; warm to touch  Vascular: 2+ peripheral pulses x b/l LE  Neurological: Slightly decreased sensation in b/l LE; CN II-XII intact bilaterally. A&Ox3.    Musculoskeletal: 5/5 strength b/l upper and lower extremities; no joint swelling. Nontender LLE.  Skin: Closed ulcer on L foot with very mild erythema; no swelling, purulent discharge, or tenderness.      LABS:                        14.5   14.93 )-----------( 313      ( 23 Sep 2019 06:40 )             43.9                     RADIOLOGY & ADDITIONAL TESTS:    Imaging Personally Reviewed:    Consultant(s) Notes Reviewed:      Care Discussed with Consultants/Other Providers:

## 2019-09-24 NOTE — PROGRESS NOTE ADULT - PROBLEM SELECTOR PLAN 7
- DVT: Lovenox  - Diet: DASH/TLC consistent carbohydrates  -Dispo: pending - DVT: Lovenox  - Diet: DASH/TLC consistent carbohydrates  - Dispo: Home

## 2019-09-24 NOTE — PROGRESS NOTE ADULT - ATTENDING COMMENTS
Pt seen and examined.  S/p Left SFA and AT angioplasty.  Palp left AT pulse. Cellulitis resolved. Dry wound on lateral food. Wound care per podiatry recommendations.   Continue aspirin and plavix.  Follow up as outpatient with me. [phone]
63 year old man with PAD, left foot non healing wound.  AVELINA/PVR reviewed - R AVELINA mildly decreased (0.81). L AVELINA moderately decreased (0.63) with severely decreased TBI (0.33).  Will plan for LLE angiogram with possible intervention tomorrow. Discussed procedure, risks, benefits, alternatives with patient and wife in detail at bedside. All questions answered.   Please document medical optimization.   NPO after midnight.
Patient seen and examined by myself , case discussed  with resident ,agree with the above finding and plan  pt c/o pain ,  numbness and  tingling in B/L LE Podiatry eval appreciated , left foot wound- eschar debrided,   - AVELINA/PVR noted , significant for severe dz in LLE f/u , MRI   will c/w vanco and Zosyn , trend CBC for leucocytosis   f/u Blood and wound cx  Vascular eval appreciated , plan for LLE angiogram in am   elevated lactate:  repeat wnl   DM type 2, hba1c 9.4 , FSBS controlled, c/w  ISS   DVT PPX  Pt medically optimized for procedure
Patient seen and examined by myself , case discussed  with resident ,agree with the above finding and plan  pt c/o pain ,  numbness and  tingling in B/L LE Podiatry eval appreciated , left foot wound- eschar debrided,   - AVELINA/PVR noted , significant for severe dz in LLE f/u , MRI   will c/w vanco and Zosyn , trend CBC for leucocytosis   f/u wound cx  MRI concerning for OM, will request ID eval   Vascular eval appreciated , plan for LLE angiogram in am   elevated lactate:  repeat wnl   DM type 2, hba1c 9.4 , FSBS controlled, c/w  ISS   DVT PPX  Pt medically optimized for procedure
Patient seen and examined by myself , case discussed  with resident ,agree with the above finding and plan   pt admitted with Left foot ulcer,  Podiatry eval appreciated , left foot wound- eschar debrided,   - AVELINA/PVR noted , significant for severe dz in LLE   on  vanco and Zosyn , trend CBC for leucocytosis , improving   wound cx noted, klebsiella, MSSA   MRI concerning for OM, ID eval appreciate, recommend DC vanco and c/w Zosyn   Vascular eval appreciated , plan for LLE angiogram today    will f/u podiatry recs following revascularization   elevated lactate:  repeat wnl   DM type 2, hba1c 9.4 , FSBS controlled, c/w  ISS   DVT PPX
Patient seen and examined. Agree with above note by resident.    #OM of left foot - polymicrobial infection in setting of diabetic foot infection. At this time patient needs PICC line for 6 weeks of antibiotics. Plan for ceftriaxone. Patient is not ready for discharge today as home infusion needs to be setup. This was discussed with patient however he remains adamant to be discharged. He will need to leave AMA if he chooses to leave. ID and podiatry inpt appreciated.     #PAD - s/p angiogram with angioplasty by vascular. Will need outpt follow up.     #DM2 with hyperglycemia - poor glycemic control given hgbA1c of 9.3. Patient advised to follow up with PCP for close followup. Will c/w farxiga and increase Jentadueto. If no improvement in A1c patient may need to be transitioned to insulin.     Plan discussed with patient and wife at bedside.
Patient seen and examined by myself , case discussed  with resident ,agree with the above finding and plan   pt admitted with Left foot ulcer,  Podiatry eval appreciated , left foot wound- eschar debrided,   - AVELINA/PVR noted , significant for severe dz in LLE   -was on  vanco and Zosyn , trend CBC for leucocytosis ,  wound cx noted, klebsiella, MSSA   MRI concerning for OM, ID eval appreciate, recommend DC vanco and c/w Zosyn   Vascular eval appreciated , s/p  LLE angiogram with balloon angioplasty on 9/20    will f/u podiatry recs following revascularization   elevated lactate:  repeat wnl   DM type 2, hba1c 9.4 , FSBS controlled, c/w  ISS   DVT PPX  Dispo: pending podiatry clearance and Id recommendation for Abx, pt may  need PICC if IV abx recommended
Patient seen and examined. Agree with above note by resident.    #OM of left foot - polymicrobial infection in setting of diabetic foot infection. At this time patient needs PICC line for 6 weeks of antibiotics. Plan for ceftriaxone. ID and podiatry inpt appreciated. PICC line in place.      #PAD - s/p angiogram with angioplasty by vascular. Will need outpt follow up.     #DM2 with hyperglycemia - poor glycemic control given hgbA1c of 9.3. Patient states he didn't have access to his meds for 2 weeks last month but now has 3 month supply at home. Will c/w home regimen of farxiga, Jentadueto and toujeo. Patient counselled on signs and symptoms of hypoglycemia. Pt to f/u with outpt endo.     Plan discussed with patient and wife at bedside. DC home today. Time spent 40 mins
Patient seen and examined by myself , case discussed  with resident ,agree with the above finding and plan   pt admitted with Left foot ulcer,  Podiatry eval appreciated , left foot wound- eschar debrided,   - AVELINA/PVR noted , significant for severe dz in LLE   on  vanco and Zosyn , trend CBC for leucocytosis , improving   wound cx noted, klebsiella, MSSA   MRI concerning for OM, ID eval appreciate, recommend DC vanco and c/w Zosyn   Vascular eval appreciated , s/p  LLE angiogram with balloon angioplasty on 9/20    will f/u podiatry recs following revascularization   elevated lactate:  repeat wnl   DM type 2, hba1c 9.4 , FSBS controlled, c/w  ISS   DVT PPX

## 2019-09-24 NOTE — DIETITIAN INITIAL EVALUATION ADULT. - OTHER INFO
Per chart review patient with medical history of T2DM, CAD s/p stents presenting with left foot ulcer, admitted for further management. Patient reports good appetite/PO intake prior to admission. Consumes 2-3 meals daily. States he avoids white bread, soda and juice. Will consume diet/no sugar beverages. Also reports limiting sodium intake. Reports checking Finger sticks daily at home and that readings range in the 150's. Informed patient of HbA1c result 9.3%. Patient notes that for 2 weeks he was not taking medication for diabetes due to issues with medication prices (one medicine was $800). Follows up with endocrinologist outpatient. Provided diet education regarding Consistent Carbohydrate diet and portion control. Written materials provided.

## 2019-09-24 NOTE — DISCHARGE NOTE NURSING/CASE MANAGEMENT/SOCIAL WORK - PATIENT PORTAL LINK FT
You can access the FollowMyHealth Patient Portal offered by [facility] by registering at the following website: [email or web address]. By joining [employer]’s FollowMyHealth portal, you will also be able to view your health information using other applications (apps) compatible with our system.

## 2019-09-24 NOTE — DIETITIAN INITIAL EVALUATION ADULT. - PERTINENT LABORATORY DATA
09-23 Na n/a   Glu n/a   K+ n/a   Cr n/a   BUN n/a   Phos n/a   Alb n/a   PAB n/a   Hgb 14.5 g/dL Hct 43.9 % Hemoglobin A1C, Whole Blood: 9.3 % (09-18-19 @ 05:39)   24Hr FS:263 mg/dL

## 2019-09-27 LAB
BACTERIA BLD CULT: SIGNIFICANT CHANGE UP
BACTERIA BLD CULT: SIGNIFICANT CHANGE UP

## 2019-10-24 ENCOUNTER — APPOINTMENT (OUTPATIENT)
Dept: VASCULAR SURGERY | Facility: CLINIC | Age: 64
End: 2019-10-24

## 2019-11-14 ENCOUNTER — APPOINTMENT (OUTPATIENT)
Dept: CARDIOLOGY | Facility: CLINIC | Age: 64
End: 2019-11-14
Payer: MEDICARE

## 2019-11-14 ENCOUNTER — NON-APPOINTMENT (OUTPATIENT)
Age: 64
End: 2019-11-14

## 2019-11-14 VITALS
BODY MASS INDEX: 30.36 KG/M2 | OXYGEN SATURATION: 98 % | HEIGHT: 69 IN | HEART RATE: 82 BPM | DIASTOLIC BLOOD PRESSURE: 67 MMHG | WEIGHT: 205 LBS | SYSTOLIC BLOOD PRESSURE: 106 MMHG

## 2019-11-14 DIAGNOSIS — Z00.00 ENCOUNTER FOR GENERAL ADULT MEDICAL EXAMINATION W/OUT ABNORMAL FINDINGS: ICD-10-CM

## 2019-11-14 DIAGNOSIS — E11.9 TYPE 2 DIABETES MELLITUS W/OUT COMPLICATIONS: ICD-10-CM

## 2019-11-14 DIAGNOSIS — Z01.810 ENCOUNTER FOR PREPROCEDURAL CARDIOVASCULAR EXAMINATION: ICD-10-CM

## 2019-11-14 DIAGNOSIS — F17.200 NICOTINE DEPENDENCE, UNSPECIFIED, UNCOMPLICATED: ICD-10-CM

## 2019-11-14 DIAGNOSIS — I70.219 ATHEROSCLEROSIS OF NATIVE ARTERIES OF EXTREMITIES WITH INTERMITTENT CLAUDICATION, UNSPECIFIED EXTREMITY: ICD-10-CM

## 2019-11-14 DIAGNOSIS — E78.00 PURE HYPERCHOLESTEROLEMIA, UNSPECIFIED: ICD-10-CM

## 2019-11-14 DIAGNOSIS — R07.9 CHEST PAIN, UNSPECIFIED: ICD-10-CM

## 2019-11-14 DIAGNOSIS — I25.10 ATHEROSCLEROTIC HEART DISEASE OF NATIVE CORONARY ARTERY W/OUT ANGINA PECTORIS: ICD-10-CM

## 2019-11-14 PROCEDURE — 99214 OFFICE O/P EST MOD 30 MIN: CPT

## 2019-11-14 PROCEDURE — 93000 ELECTROCARDIOGRAM COMPLETE: CPT

## 2019-11-14 NOTE — PROGRESS NOTE ADULT - PROBLEM SELECTOR PLAN 3
Referred by: Ty Davis anterior isometrics in plane of scapulae, 4 x 5\"  Wall angels standing, 1 x 10 - discontinued in supine due to pain   Pec minor stretch at corner- patient continues to reports increased tension at the R scalenes, discontinued.   Supine B shoulder isometrics with cane in hands, 2 x 20\" with perturbations in all directions      Manual Therapy:  Soft tissue mobilization R pec minor and R subclavius muscle  Manual pec minor gentle stretching into R arm PROM abduction and extension  1st rib mobilization R, grade II-III  R SC and AC  Joint superior and inferior mobilization, Grade II  Gentle manual median nerve glides R, x 10        Neuromuscular Re-Education:  PNF RUE in D2 pattern: rhythmic initiation   Body blade standing with 0 deg shoulder flexion/90 deg elbow flexion: ER/IR 2 x 20\", flex/ext 2 x 20\"  Body blade standing with 0 deg shoulder flexion/0 deg elbow flexion: blade moving superior/inferior, 2 x 20\"    Skilled input: verbal instruction/cues and tactile instruction/cues    Writer verbally educated and received verbal consent for hand placement, positioning of patient, and techniques to be performed today from patient for clothing adjustments for techniques, hand placement and palpation for techniques and therapist position for techniques as described above and how they are pertinent to the patient's plan of care.    Home Exercise Program: (*above indicates provided as part of home exercise program)    Prone shoulder row  Prone horizontal ext  Side lying shoulder ER  Wall pec stretch  Supine PNF D2 lift  Shoulder ER R  Shoulder extension and PNF chop  Supine shoulder flexion   Wand  Bent over row  Bent over horiz. shoulder abd.  Wall pushups  Supine 90/90 alphabets      ASSESSMENT                                                                                                                 Patient demonstrates neural mobility deficits of the median nerve and has subjective reports of symptoms in common  Venous duplex - L AVELINA decreased at 0.63, L TBI decreased at 0.33 - indicative of arterial disease, likely  - Vascular surgery to perform angiogram today - Venous duplex - L AVELINA decreased at 0.63, L TBI decreased at 0.33 - indicative of arterial disease, likely  - S/p angiogram/angioplasty

## 2020-04-19 ENCOUNTER — RX RENEWAL (OUTPATIENT)
Age: 65
End: 2020-04-19

## 2020-04-19 RX ORDER — METOPROLOL SUCCINATE 50 MG/1
50 TABLET, EXTENDED RELEASE ORAL DAILY
Qty: 90 | Refills: 1 | Status: ACTIVE | COMMUNITY
Start: 2019-08-02 | End: 1900-01-01

## 2020-07-28 ENCOUNTER — RX RENEWAL (OUTPATIENT)
Age: 65
End: 2020-07-28

## 2020-07-28 RX ORDER — LISINOPRIL 5 MG/1
5 TABLET ORAL
Qty: 90 | Refills: 3 | Status: ACTIVE | COMMUNITY
Start: 2019-08-02 | End: 1900-01-01

## 2021-02-17 NOTE — PATIENT PROFILE ADULT - FUNCTIONAL SCREEN CURRENT LEVEL: SWALLOWING (IF SCORE 2 OR MORE FOR ANY ITEM, CONSULT REHAB SERVICES), MLM)

## 2021-03-01 RX ORDER — SIMVASTATIN 10 MG/1
10 TABLET, FILM COATED ORAL DAILY
Qty: 90 | Refills: 3 | Status: ACTIVE | COMMUNITY
Start: 2019-08-02 | End: 1900-01-01

## 2021-03-01 RX ORDER — ESCITALOPRAM OXALATE 20 MG/1
20 TABLET ORAL
Qty: 90 | Refills: 3 | Status: ACTIVE | COMMUNITY
Start: 2019-08-02 | End: 1900-01-01

## 2021-03-01 RX ORDER — CLOPIDOGREL BISULFATE 75 MG/1
75 TABLET, FILM COATED ORAL DAILY
Qty: 90 | Refills: 3 | Status: ACTIVE | COMMUNITY
Start: 2019-08-02 | End: 1900-01-01

## 2021-12-06 NOTE — ED ADULT NURSE NOTE - NS ED PATIENT SAFETY CONCERN
Diabetes well controlled,  labs are normal including blood count, kidney function, liver function and cholesterol, patient can be informed No

## 2022-12-01 NOTE — H&P ADULT - NSHPSOURCEINFORD_GEN_ALL_CORE
Operative Note    Patient: Akash Dougherty  YOB: 1967  MRN: [id number]    Date of Procedure: 12/1/Zee Rodriguez MD    Surgical Assistant: Surg Asst-1: Iliana Noriega    Anesthesia: Spinal     Estimated Blood Loss (mL):  701     Complications: None    Specimens: * No specimens in log *     Implants:   Implant Name Type Inv. Item Serial No.  Lot No. LRB No. Used Action   CUP ACET [id number] 10 H HIP TI Shala Samir II VIP TAPR - [id number]  CUP ACET [id number] 10 H HIP TI GRIPTION MULT H II VIP TAPR  [facility] [id number] Right 1 Implanted   SCREW BNE L35MM DIA6.5MM CANC HIP DOME PINN - [id number]  SCREW BNE L35MM DIA6.5MM CANC HIP DOME PINN  [facility] [id number] Right 1 Implanted   LINER ACET OD52MM ID36MM HIP ALTRX PINN - [id number]  LINER ACET OD52MM ID36MM HIP ALTRX PINN  [facility] [id number] Jersey Shore Hospital ProcureNetworksSTracy Medical Center [id number] Right 1 Implanted   HEAD FEM [id number] +1.5MM OFFSET 12/14 TAPR HIP CERAMIC - [id number]  HEAD FEM [id number] +1.5MM OFFSET 12/14 TAPR HIP CERAMIC  [facility] [id number] 

## 2023-08-10 NOTE — PROVIDER CONTACT NOTE (OTHER) - RECOMMENDATIONS
ENT
Epilepsy
Hospitalist
Palliative Care
Cardiology
Gastroenterology
Heme/Onc
Rehab Medicine
Infectious Disease
Nephrology
ENT
Neurology
continue to monitor
continue to monitor
Neurology

## 2024-07-05 ENCOUNTER — EMERGENCY (EMERGENCY)
Facility: HOSPITAL | Age: 69
LOS: 1 days | Discharge: HOME | End: 2024-07-05
Attending: EMERGENCY MEDICINE | Admitting: EMERGENCY MEDICINE
Payer: MEDICARE

## 2024-07-05 VITALS
WEIGHT: 214.95 LBS | RESPIRATION RATE: 17 BRPM | HEIGHT: 69 IN | DIASTOLIC BLOOD PRESSURE: 85 MMHG | OXYGEN SATURATION: 100 % | TEMPERATURE: 98 F | SYSTOLIC BLOOD PRESSURE: 148 MMHG | HEART RATE: 73 BPM

## 2024-07-05 VITALS
SYSTOLIC BLOOD PRESSURE: 121 MMHG | TEMPERATURE: 98 F | DIASTOLIC BLOOD PRESSURE: 78 MMHG | RESPIRATION RATE: 18 BRPM | OXYGEN SATURATION: 96 % | HEART RATE: 66 BPM

## 2024-07-05 PROCEDURE — 12013 RPR F/E/E/N/L/M 2.6-5.0 CM: CPT

## 2024-07-05 PROCEDURE — 72125 CT NECK SPINE W/O DYE: CPT | Mod: MC

## 2024-07-05 PROCEDURE — 90471 IMMUNIZATION ADMIN: CPT

## 2024-07-05 PROCEDURE — 90715 TDAP VACCINE 7 YRS/> IM: CPT

## 2024-07-05 PROCEDURE — 70450 CT HEAD/BRAIN W/O DYE: CPT | Mod: MC

## 2024-07-05 PROCEDURE — 73130 X-RAY EXAM OF HAND: CPT

## 2024-07-05 PROCEDURE — 99285 EMERGENCY DEPT VISIT HI MDM: CPT | Mod: 25

## 2024-07-05 PROCEDURE — 99284 EMERGENCY DEPT VISIT MOD MDM: CPT | Mod: 25

## 2024-07-05 PROCEDURE — 70486 CT MAXILLOFACIAL W/O DYE: CPT | Mod: MC

## 2024-07-05 PROCEDURE — 72125 CT NECK SPINE W/O DYE: CPT | Mod: 26,MC

## 2024-07-05 PROCEDURE — 73130 X-RAY EXAM OF HAND: CPT | Mod: 26,RT

## 2024-07-05 PROCEDURE — 70450 CT HEAD/BRAIN W/O DYE: CPT | Mod: 26,MC

## 2024-07-05 PROCEDURE — 70486 CT MAXILLOFACIAL W/O DYE: CPT | Mod: 26,MC

## 2024-07-05 PROCEDURE — 99053 MED SERV 10PM-8AM 24 HR FAC: CPT

## 2024-07-05 RX ORDER — LIDOCAINE HYDROCHLORIDE,EPINEPHRINE BITARTRATE 15; .005 MG/ML; MG/ML
10 INJECTION, SOLUTION EPIDURAL; INFILTRATION; INTRACAUDAL; PERINEURAL ONCE
Refills: 0 | Status: COMPLETED | OUTPATIENT
Start: 2024-07-05 | End: 2024-07-05

## 2024-07-05 RX ORDER — CEPHALEXIN 500 MG
500 CAPSULE ORAL ONCE
Refills: 0 | Status: COMPLETED | OUTPATIENT
Start: 2024-07-05 | End: 2024-07-05

## 2024-07-05 RX ORDER — ACETAMINOPHEN 325 MG
975 TABLET ORAL ONCE
Refills: 0 | Status: COMPLETED | OUTPATIENT
Start: 2024-07-05 | End: 2024-07-05

## 2024-07-05 RX ORDER — TETANUS TOXOID, REDUCED DIPHTHERIA TOXOID AND ACELLULAR PERTUSSIS VACCINE, ADSORBED 5; 2.5; 8; 8; 2.5 [IU]/.5ML; [IU]/.5ML; UG/.5ML; UG/.5ML; UG/.5ML
0.5 SUSPENSION INTRAMUSCULAR ONCE
Refills: 0 | Status: COMPLETED | OUTPATIENT
Start: 2024-07-05 | End: 2024-07-05

## 2024-07-05 RX ORDER — CEPHALEXIN 500 MG
1 CAPSULE ORAL
Qty: 30 | Refills: 0
Start: 2024-07-05 | End: 2024-07-14

## 2024-07-05 RX ADMIN — TETANUS TOXOID, REDUCED DIPHTHERIA TOXOID AND ACELLULAR PERTUSSIS VACCINE, ADSORBED 0.5 MILLILITER(S): 5; 2.5; 8; 8; 2.5 SUSPENSION INTRAMUSCULAR at 08:10

## 2024-07-05 RX ADMIN — Medication 500 MILLIGRAM(S): at 08:32

## 2024-07-05 RX ADMIN — Medication 975 MILLIGRAM(S): at 06:39

## 2024-07-05 RX ADMIN — LIDOCAINE HYDROCHLORIDE,EPINEPHRINE BITARTRATE 10 MILLILITER(S): 15; .005 INJECTION, SOLUTION EPIDURAL; INFILTRATION; INTRACAUDAL; PERINEURAL at 07:10

## 2024-07-12 ENCOUNTER — EMERGENCY (EMERGENCY)
Facility: HOSPITAL | Age: 69
LOS: 1 days | Discharge: HOME | End: 2024-07-12
Attending: EMERGENCY MEDICINE | Admitting: EMERGENCY MEDICINE
Payer: MEDICARE

## 2024-07-12 VITALS
WEIGHT: 214.95 LBS | DIASTOLIC BLOOD PRESSURE: 68 MMHG | HEART RATE: 77 BPM | SYSTOLIC BLOOD PRESSURE: 103 MMHG | OXYGEN SATURATION: 98 % | TEMPERATURE: 98 F | HEIGHT: 69 IN | RESPIRATION RATE: 18 BRPM

## 2024-07-12 DIAGNOSIS — S01.411D LACERATION WITHOUT FOREIGN BODY OF RIGHT CHEEK AND TEMPOROMANDIBULAR AREA, SUBSEQUENT ENCOUNTER: ICD-10-CM

## 2024-07-12 DIAGNOSIS — Z48.02 ENCOUNTER FOR REMOVAL OF SUTURES: ICD-10-CM
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
## 2024-07-12 PROCEDURE — G0463: CPT

## 2024-07-12 PROCEDURE — L9995: CPT

## 2025-02-05 ENCOUNTER — APPOINTMENT (OUTPATIENT)
Dept: CARDIOLOGY | Facility: CLINIC | Age: 70
End: 2025-02-05
